# Patient Record
Sex: FEMALE | Race: WHITE | Employment: UNEMPLOYED | URBAN - METROPOLITAN AREA
[De-identification: names, ages, dates, MRNs, and addresses within clinical notes are randomized per-mention and may not be internally consistent; named-entity substitution may affect disease eponyms.]

---

## 2018-06-18 ENCOUNTER — HOSPITAL ENCOUNTER (OUTPATIENT)
Dept: GENERAL RADIOLOGY | Age: 58
Discharge: HOME | End: 2018-06-18
Attending: FAMILY MEDICINE
Payer: MEDICAID

## 2018-06-18 DIAGNOSIS — R13.10 DYSPHAGIA: ICD-10-CM

## 2018-06-18 PROCEDURE — 74230 X-RAY XM SWLNG FUNCJ C+: CPT

## 2018-06-18 PROCEDURE — 92611 MOTION FLUOROSCOPY/SWALLOW: CPT

## 2018-06-18 NOTE — PROGRESS NOTES
[address]    Speech Pathology Modified barium swallow Study  Patient: Mane Shannon (38 y.o. female)  Date: 6/18/2018  Referring Provider:  Waldo Gallagher:   Patient with noted hypernasality in voice. She reports that she is here because she had an aspiration event earlier this month that led to an admission at [address] [place] [address]. She c/o solid  And pill dysphagia since her cervical surgery in  2008. She reports that she has been receiving [facility] therapy services and is going to get a biopsy of a swollen spot in her L upper chest later this week. OBJECTIVE:   Past Medical History:   Past Medical History:   Diagnosis Date    ARF (acute renal failure) ([facility] [address] 7/4/2014    Arthritis     Cancer (HCC)     cervical and labia cancer    Carpal tunnel syndrome     Chronic pain     COPD     DDD (degenerative disc disease)     Depression 7/4/2014    Fibromyalgia     Gastrointestinal disorder     ischemic bowel    Ill-defined condition     ischemic colitis 2014    Psychiatric disorder     anxiety     Past Surgical History:   Procedure Laterality Date    HX CARPAL TUNNEL RELEASE      HX GI      ileostomy July 2014    HX GYN  hysterectomy age 23    HX HEENT      tmj    HX ORTHOPAEDIC      neck surgery    HX ORTHOPAEDIC      knee surgery    HX OTHER SURGICAL       Current Dietary Status:  Regular, thins  Radiologist: [place]  Film Views: Lateral  Patient Position: upright in Hausted chair    Trial 1: Trial 2:   Consistency Presented: Thin liquid; Solid;Puree;Pill/Tablet     How Presented: Self-fed/presented;Cup/sip;Cup/gulp; Spoon;Straw;Successive swallows      ORAL PHASE:      Bolus Acceptance: No impairment     Bolus Formation/Control: No impairment:    :     Propulsion: No impairment     Oral Residue: None   PHARYNGEAL PHASE:      Initiation of Swallow: Triggered , with thins, when pharynx full. she had deep penetration to cords in large amounts with thins consistently     Timing: Pooling 1-5 sec     Penetration: Before swallow;During swallow; To cords;From initial swallow (with thins only) in large amounts     Aspiration/Timing: No evidence of aspiration     Pharyngeal Clearance: No residue                  ESOPHAGEAL PHASE:   She had pill stuck in mid chest, which cleared with sips of thins. Then she had pill stuck at LES, which never cleared, even with sips of thins and bite of pudding. Barium pill eventually will melt. She could feel the pill stuck in both chest and lower esophagus. Since pill had difficulty clearing UES, consider that solids could back up in the pharynx and lead to post prandial aspiration event. Trial 3: Trial 4:                            :    :                                                                        Decreased Tongue Base Retraction?: No  Laryngeal Elevation: WFL (within functional limits)  Aspiration/Penetration Score: 4 (Penetration/No residue-Contrast contacts the folds, and is ejected)  Pharyngeal Symmetry: Not assessed  Pharyngeal-Esophageal Segment:  (pill stuck in mid chest. cleared to right above the UES with thins. never cleared the UES during this study, even with bites of pudding and thins)               ASSESSMENT :  Based on the objective data described above, the patient presents with functional oral-pharyngeal swallow. Mild delay with deep penetration with thins only. This is common in patients with reflux. She had some esophageal issues with pills (stuck in mid chest-cleared with liquids. Stuck at LES-never cleared). Her aspiration risk appears to be more post prandial, as she may fill esophagus with solids during a meal.  . PLAN/RECOMMENDATIONS :  Diet as tolerated. Try to change meds to liquid form if possible. Seek GI consult for evaluation of esophageal issues.  ? EGD     COMMUNICATION/EDUCATION:   The above findings and recommendations were discussed with: patient who verbalized understanding.     Thank you for this referral.  Brandon Wetzel, SLP  Time Calculation: 15 mins

## 2018-08-03 ENCOUNTER — HOSPITAL ENCOUNTER (EMERGENCY)
Age: 58
Discharge: HOME | End: 2018-08-03
Attending: EMERGENCY MEDICINE
Payer: MEDICAID

## 2018-08-03 ENCOUNTER — APPOINTMENT (OUTPATIENT)
Dept: GENERAL RADIOLOGY | Age: 58
End: 2018-08-03
Attending: EMERGENCY MEDICINE
Payer: MEDICAID

## 2018-08-03 VITALS
TEMPERATURE: 98.1 F | HEIGHT: 65 IN | OXYGEN SATURATION: 100 % | WEIGHT: 90 LBS | DIASTOLIC BLOOD PRESSURE: 78 MMHG | HEART RATE: 66 BPM | RESPIRATION RATE: 18 BRPM | BODY MASS INDEX: 14.99 KG/M2 | SYSTOLIC BLOOD PRESSURE: 114 MMHG

## 2018-08-03 DIAGNOSIS — S62.308A: Primary | ICD-10-CM

## 2018-08-03 PROCEDURE — 73562 X-RAY EXAM OF KNEE 3: CPT

## 2018-08-03 PROCEDURE — 75810000053 HC SPLINT APPLICATION

## 2018-08-03 PROCEDURE — 74011250637 HC RX REV CODE- 250/637: Performed by: EMERGENCY MEDICINE

## 2018-08-03 PROCEDURE — 99284 EMERGENCY DEPT VISIT MOD MDM: CPT

## 2018-08-03 PROCEDURE — 73130 X-RAY EXAM OF HAND: CPT

## 2018-08-03 RX ORDER — ACETAMINOPHEN 325 MG/1
650 TABLET ORAL
Status: COMPLETED | OUTPATIENT
Start: 2018-08-03 | End: 2018-08-03

## 2018-08-03 RX ORDER — OXYCODONE AND ACETAMINOPHEN 7.5; 325 MG/1; MG/1
1 TABLET ORAL
Qty: 10 TAB | Refills: 0 | Status: SHIPPED | OUTPATIENT
Start: 2018-08-03 | End: 2018-09-01

## 2018-08-03 RX ORDER — ONDANSETRON 4 MG/1
8 TABLET, ORALLY DISINTEGRATING ORAL
Status: COMPLETED | OUTPATIENT
Start: 2018-08-03 | End: 2018-08-03

## 2018-08-03 RX ADMIN — ACETAMINOPHEN 650 MG: 325 TABLET ORAL at 15:16

## 2018-08-03 RX ADMIN — ONDANSETRON 8 MG: 4 TABLET, ORALLY DISINTEGRATING ORAL at 15:16

## 2018-08-03 NOTE — ED PROVIDER NOTES
HPI Comments: 60 y.o. female with past medical history significant for arthritis, COPD, DDD, anxiety, fibromyalgia, carpal tunnel syndrome, acute renal failure, depression, GI disorder, and cervical cancer who presents via EMS from home with chief complaint of right hand pain. Patient notes falling last night because her \"knees gave out. \" She notes hitting her right hand on the fireplace mantel and complains of right hand pain and swelling. Patient also reports right knee pain. Pertinent negatives include abdominal pain. There are no other acute medical concerns at this time. Social hx: Current tobacco use (0.25 packs/day); denies alcohol use; denies illicit drug use  PCP: David Meeks MD    Note written by Farhat Kraft, as dictated by Stephen Ring MD 2:50 PM      The history is provided by the patient. No  was used.         Past Medical History:   Diagnosis Date    [facility]F [facility]acute renal failure) ([facility] [address] 7/4/2014    Arthritis     Cancer (HCC)     cervical and labia cancer    Carpal tunnel syndrome     Chronic pain     COPD     DDD (degenerative disc disease)     Depression 7/4/2014    Fibromyalgia     Gastrointestinal disorder     ischemic bowel    Ill-defined condition     ischemic colitis 2014    Psychiatric disorder     anxiety       Past Surgical History:   Procedure Laterality Date    HX CARPAL TUNNEL RELEASE      HX GI      ileostomy July 2014    HX GYN  hysterectomy age 23    HX HEENT      tmj    HX ORTHOPAEDIC      neck surgery    HX ORTHOPAEDIC      knee surgery    HX OTHER SURGICAL           Family History:   Problem Relation Age of Onset    Heart Disease Mother     Heart Disease Father     Alcohol abuse Neg Hx     Arthritis-rheumatoid Neg Hx     Asthma Neg Hx     Bleeding Prob Neg Hx     Cancer Neg Hx     Diabetes Neg Hx     Elevated Lipids Neg Hx     Headache Neg Hx     Hypertension Neg Hx     Lung Disease Neg Hx     Migraines Neg Hx     Psychiatric Disorder Neg Hx     Stroke Neg Hx     Mental Retardation Neg Hx        Social History     Social History    Marital status: LEGALLY      Spouse name: N/A    Number of children: N/A    Years of education: N/A     Occupational History    Not on file. Social History Main Topics    Smoking status: Current Every Day Smoker     Packs/day: 0.25    Smokeless tobacco: Never Used      Comment: 3 a day    Alcohol use No    Drug use: No    Sexual activity: Yes     Partners: Male     Other Topics Concern    Not on file     Social History Narrative         ALLERGIES: Codeine; Mucinex [guaifenesin]; Morphine; Oxycontin [oxycodone]; Pcn [penicillins]; Sulfa (sulfonamide antibiotics); Tramadol; Aspirin; Gabapentin; Trazodone; and Pamelor [nortriptyline]    Review of Systems   Constitutional: Negative for fever. Eyes: Negative for visual disturbance. Respiratory: Negative for cough, shortness of breath and wheezing. Cardiovascular: Negative for chest pain and leg swelling. Gastrointestinal: Negative for abdominal pain, diarrhea, nausea and vomiting. Genitourinary: Negative for dysuria. Musculoskeletal: Positive for arthralgias (right knee) and myalgias (right hand). Negative for back pain and neck stiffness. Skin: Negative for rash. Neurological: Negative. Negative for syncope and headaches. Psychiatric/Behavioral: Negative for confusion. All other systems reviewed and are negative. Vitals:    08/03/18 1451 08/03/18 1501   BP: 126/74 114/78   Pulse: 66 66   Resp: 18 18   Temp: 98.1 °F (36.7 °C)    SpO2: 100% 100%   Weight: 40.8 kg (90 lb)    Height: 5' 5\" (1.651 m)             Physical Exam   Constitutional: She appears well-developed and well-nourished. No distress. HENT:   Head: Normocephalic. Eyes: Pupils are equal, round, and reactive to light. Neck: Normal range of motion. Cardiovascular: Normal rate and regular rhythm. No murmur heard.   Pulmonary/Chest: Effort normal and breath sounds normal. No respiratory distress. Abdominal: Soft. There is no tenderness. Colostomy in LLQ   Musculoskeletal: Normal range of motion. She exhibits no edema. Right knee: She exhibits swelling (mild). She exhibits no effusion. Tenderness (mild) found. Right hand: Decreased sensation noted. Large hematoma dorsum of right hand  Movers fingers  Radial, median and ulnar function normal     Neurological: She is alert. She has normal strength. Skin: Skin is warm and dry. Psychiatric: She has a normal mood and affect. Her behavior is normal.   Nursing note and vitals reviewed.      Note written by Farhat Acosta, as dictated by Juana Benítez MD 2:50 PM    MDM      ED Course       Procedures

## 2018-08-03 NOTE — ED TRIAGE NOTES
Patient arrives via ems from home. States she fell last night at 2230 when her right knee gave out, hx of same. Per patient hit top of right hand on fie place . Denies Hitting head or LOC. Patient now co hand pain and swelling which was also noted in triage. Patient on plavix. Patient has COPD on 3L NC at all times.  Patient given 4mg zofran disintegrating tablet en route

## 2018-08-03 NOTE — DISCHARGE INSTRUCTIONS
Broken Hand: Care Instructions  Your Care Instructions  A hand can break (fracture) during sports, a fall, or other accidents. The break may happen when your hand twists, is hit, or is used to protect you in a fall. Fractures can range from a small, hairline crack, to a bone or bones broken into two or more pieces. Your treatment depends on how bad the break is. Your doctor may have put your hand in a brace, splint, or cast to allow it to heal or to keep it stable until you see another doctor. It may take weeks or months for your hand to heal. You can help it heal with some care at home. You heal best when you take good care of yourself. Eat a variety of healthy foods, and don't smoke. Follow-up care is a key part of your treatment and safety. Be sure to make and go to all appointments, and call your doctor if you are having problems. It's also a good idea to know your test results and keep a list of the medicines you take. How can you care for yourself at home? · Put ice or a cold pack on your hand for 10 to 20 minutes at a time. Try to do this every 1 to 2 hours for the next 3 days (when you are awake). Put a thin cloth between the ice and your cast or splint. Keep your cast or splint dry. · Follow the cast care instructions your doctor gives you. If you have a splint, do not take it off unless your doctor tells you to. · Be safe with medicines. Take pain medicines exactly as directed. ¨ If the doctor gave you a prescription medicine for pain, take it as prescribed. ¨ If you are not taking a prescription pain medicine, ask your doctor if you can take an over-the-counter medicine. · Prop up your hand on pillows when you sit or lie down in the first few days after the injury. Keep your hand higher than the level of your heart. This will help reduce swelling. · Follow instructions for exercises to keep your arm strong.   · Wiggle your uninjured fingers often to reduce swelling and stiffness, but do not use that hand to grasp or carry anything. When should you call for help? Call your doctor now or seek immediate medical care if:    · You have new or worse pain.     · Your hand or fingers are cool or pale or change color.     · Your cast or splint feels too tight.     · You have tingling, weakness, or numbness in your hand or fingers.    Watch closely for changes in your health, and be sure to contact your doctor if:    · You do not get better as expected.     · You have problems with your cast or splint. Where can you learn more? Go to http://lauryn-dillon.info/. Enter [phone] 125-475 in the search box to learn more about \"Broken Hand: Care Instructions. \"  Current as of: November 29, 2017  Content Version: 11.7  © 5123-6731 [employer]. Care instructions adapted under license by [employer] (which disclaims liability or warranty for this information). If you have questions about a medical condition or this instruction, always ask your healthcare professional. [place] [address] any warranty or liability for your use of this information.

## 2018-08-17 ENCOUNTER — APPOINTMENT (OUTPATIENT)
Dept: GENERAL RADIOLOGY | Age: 58
End: 2018-08-17
Attending: STUDENT IN AN ORGANIZED HEALTH CARE EDUCATION/TRAINING PROGRAM
Payer: MEDICAID

## 2018-08-17 ENCOUNTER — HOSPITAL ENCOUNTER (EMERGENCY)
Age: 58
Discharge: HOME | End: 2018-08-18
Attending: STUDENT IN AN ORGANIZED HEALTH CARE EDUCATION/TRAINING PROGRAM
Payer: MEDICAID

## 2018-08-17 DIAGNOSIS — S80.01XA CONTUSION OF RIGHT KNEE, INITIAL ENCOUNTER: Primary | ICD-10-CM

## 2018-08-17 PROCEDURE — 73562 X-RAY EXAM OF KNEE 3: CPT

## 2018-08-17 PROCEDURE — 74011250636 HC RX REV CODE- 250/636: Performed by: STUDENT IN AN ORGANIZED HEALTH CARE EDUCATION/TRAINING PROGRAM

## 2018-08-17 PROCEDURE — 99284 EMERGENCY DEPT VISIT MOD MDM: CPT

## 2018-08-17 PROCEDURE — 96372 THER/PROPH/DIAG INJ SC/IM: CPT

## 2018-08-17 RX ORDER — KETOROLAC TROMETHAMINE 30 MG/ML
30 INJECTION, SOLUTION INTRAMUSCULAR; INTRAVENOUS
Status: COMPLETED | OUTPATIENT
Start: 2018-08-17 | End: 2018-08-17

## 2018-08-17 RX ADMIN — KETOROLAC TROMETHAMINE 30 MG: 30 INJECTION, SOLUTION INTRAMUSCULAR at 23:05

## 2018-08-18 VITALS
HEIGHT: 65 IN | BODY MASS INDEX: 14.99 KG/M2 | TEMPERATURE: 98.1 F | HEART RATE: 70 BPM | DIASTOLIC BLOOD PRESSURE: 75 MMHG | WEIGHT: 90 LBS | OXYGEN SATURATION: 100 % | SYSTOLIC BLOOD PRESSURE: 113 MMHG | RESPIRATION RATE: 18 BRPM

## 2018-08-18 NOTE — DISCHARGE INSTRUCTIONS
Learning About RICE (Rest, Ice, Compression, and Elevation)  What is RICE? RICE is a way to care for an injury. RICE helps relieve pain and swelling. It may also help with healing and flexibility. RICE stands for:  · Rest and protect the injured or sore area. · Ice or a cold pack used as soon as possible. · Compression, or wrapping the injured or sore area with an elastic bandage. · Elevation (propping up) the injured or sore area. How do you do RICE? You can use RICE for home treatment when you have general aches and pains or after an injury or surgery. Rest  · Do not put weight on the injury for at least 24 to 48 hours. · Use crutches for a badly sprained knee or ankle. · Support a sprained wrist, elbow, or shoulder with a sling. Ice  · Put ice or a cold pack on the injury right away to reduce pain and swelling. Frozen vegetables will also work as an ice pack. Put a thin cloth between the ice or cold pack and your skin. The cloth protects the injured area from getting too cold. · Use ice for 10 to 15 minutes at a time for the first 48 to 72 hours. Compression  · Use compression for sprains, strains, and surgeries of the arms and legs. · Wrap the injured area with an elastic bandage or compression sleeve to reduce swelling. · Don't wrap it too tightly. If the area below it feels numb, tingles, or feels cool, loosen the wrap. Elevation  · Use elevation for areas of the body that can be propped up, such as arms and legs. · Prop up the injured area on pillows whenever you use ice. Keep it propped up anytime you sit or lie down. · Try to keep the injured area at or above the level of your heart. This will help reduce swelling and bruising. Where can you learn more? Go to http://lauryn-dillon.info/. Enter N933 in the search box to learn more about \"Learning About RICE (Rest, Ice, Compression, and Elevation). \"  Current as of: November 29, 2017  Content Version: 11.7  © 2317-5093 [employer]. Care instructions adapted under license by [employer] (which disclaims liability or warranty for this information). If you have questions about a medical condition or this instruction, always ask your healthcare professional. [address] any warranty or liability for your use of this information. Contusion: Care Instructions  Your Care Instructions  Contusion is the medical term for a bruise. It is the result of a direct blow or an impact, such as a fall. Contusions are common sports injuries. Most people think of a bruise as a black-and-blue spot. This happens when small blood vessels get torn and leak blood under the skin. But bones, muscles, and organs can also get bruised. This may damage deep tissues but not cause a bruise you can see. The doctor will do a physical exam to find the location of your contusion. You may also have tests to make sure you do not have a more serious injury, such as a broken bone or nerve damage. These may include X-rays or other imaging tests like a CT scan or MRI. Deep-tissue contusions may cause pain and swelling. But if there is no serious damage, they will often get better in a few weeks with home treatment. The doctor has checked you carefully, but problems can develop later. If you notice any problems or new symptoms, get medical treatment right away. Follow-up care is a key part of your treatment and safety. Be sure to make and go to all appointments, and call your doctor if you are having problems. It's also a good idea to know your test results and keep a list of the medicines you take. How can you care for yourself at home? · Put ice or a cold pack on the sore area for 10 to 20 minutes at a time to stop swelling. Put a thin cloth between the ice pack and your skin. · Be safe with medicines. Read and follow all instructions on the label.   ¨ If the doctor gave you a prescription medicine for pain, take it as prescribed. ¨ If you are not taking a prescription pain medicine, ask your doctor if you can take an over-the-counter medicine. · If you can, prop up the sore area on pillows as much as possible for the next few days. Try to keep the sore area above the level of your heart. When should you call for help? Call your doctor now or seek immediate medical care if:  · Your pain gets worse. · You have new or worse swelling. · You have tingling, weakness, or numbness in the area near the contusion. · The area near the contusion is cold or pale. Watch closely for changes in your health, and be sure to contact your doctor if:  · You do not get better as expected. Where can you learn more? Go to [employer].be  Enter [id number] in the search box to learn more about \"Contusion: Care Instructions. \"   © 5272-2283 [employer]. Care instructions adapted under license by [facility] (which disclaims liability or warranty for this information). This care instruction is for use with your licensed healthcare professional. If you have questions about a medical condition or this instruction, always ask your healthcare professional. [place] [address] any warranty or liability for your use of this information.   Content Version: 90.2.934093; Current as of: May 22, 2015

## 2018-08-18 NOTE — ED TRIAGE NOTES
Triage:  Ground level fall secondary to \"my knee buckling\" today around 2100. Patient unable to bear weight due to pain. Diminished strength and ROM noted during exam. Slight warmth to palpation. Mild swelling. No signs of trauma or damage to skin integrity. Pain \"22/10\". Patient tearful during triage. Denies abuse/neglect at home.

## 2018-08-18 NOTE — ED NOTES
Verbal shift change report given to Ladi Lundberg (oncoming nurse) by Cosme Spivey (offgoing nurse). Report included the following information SBAR, ED Summary and MAR.

## 2018-08-18 NOTE — ED PROVIDER NOTES
HPI Comments: 60 y.o. female with past medical history significant for chronic pain, arthritis, COPD, DDD, anxiety, fibromyalgia, carpal tunnel syndrome, ARF, depression, ischemic bowel, ischemic colitis, and cervical and labial cancer who presents from home via EMS with chief complaint of hip pain. Pt states that she was walking and her right knee \"gave out\" and she fell. Pt states that her right knee has been \"burning\" and \"hurting\" since then. Pt rates her pain to be a \"22\"/10. Pt denies drinking, alcohol use, or drug use. There are no other acute medical concerns at this time. Social hx: Current smoker. PCP: Federico Ferrera MD    Note written by lio Mcclain, as dictated by Haylee Fuchs MD 10:42 PM      The history is provided by the patient. No  was used.         Past Medical History:   Diagnosis Date    ARF (acute renal failure) ([facility] [address] 7/4/2014    Arthritis     Cancer (HCC)     cervical and labia cancer    Carpal tunnel syndrome     Chronic pain     COPD     DDD (degenerative disc disease)     Depression 7/4/2014    Fibromyalgia     Gastrointestinal disorder     ischemic bowel    Ill-defined condition     ischemic colitis 2014    Psychiatric disorder     anxiety       Past Surgical History:   Procedure Laterality Date    HX CARPAL TUNNEL RELEASE      HX GI      ileostomy July 2014    HX GYN  hysterectomy age 23    HX HEENT      tmj    HX ORTHOPAEDIC      neck surgery    HX ORTHOPAEDIC      knee surgery    HX OTHER SURGICAL           Family History:   Problem Relation Age of Onset    Heart Disease Mother     Heart Disease Father     Alcohol abuse Neg Hx     Arthritis-rheumatoid Neg Hx     Asthma Neg Hx     Bleeding Prob Neg Hx     Cancer Neg Hx     Diabetes Neg Hx     Elevated Lipids Neg Hx     Headache Neg Hx     Hypertension Neg Hx     Lung Disease Neg Hx     Migraines Neg Hx     Psychiatric Disorder Neg Hx     Stroke Neg Hx     Mental Retardation Neg Hx        Social History     Social History    Marital status: LEGALLY      Spouse name: N/A    Number of children: N/A    Years of education: N/A     Occupational History    Not on file. Social History Main Topics    Smoking status: Current Every Day Smoker     Packs/day: 0.25    Smokeless tobacco: Never Used      Comment: 3 a day    Alcohol use No    Drug use: No    Sexual activity: Yes     Partners: Male     Other Topics Concern    Not on file     Social History Narrative         ALLERGIES: Codeine; Mucinex [guaifenesin]; Morphine; Oxycontin [oxycodone]; Pcn [penicillins]; Sulfa (sulfonamide antibiotics); Tramadol; Aspirin; Gabapentin; Trazodone; and Pamelor [nortriptyline]    Review of Systems   Constitutional: Negative for activity change, diaphoresis, fatigue and fever. HENT: Negative for congestion and sore throat. Eyes: Negative for photophobia and visual disturbance. Respiratory: Negative for chest tightness and shortness of breath. Cardiovascular: Negative for chest pain, palpitations and leg swelling. Gastrointestinal: Negative for abdominal pain, blood in stool, constipation, diarrhea, nausea and vomiting. Genitourinary: Negative for difficulty urinating, dysuria, flank pain, frequency and hematuria. Musculoskeletal: Positive for arthralgias (right knee pain) and myalgias (right knee pain). Negative for back pain. Neurological: Negative for dizziness, syncope, numbness and headaches. All other systems reviewed and are negative. There were no vitals filed for this visit. Physical Exam   Constitutional: She is oriented to person, place, and time. No distress. Thin and Malnourished appearing. HENT:   Head: Normocephalic and atraumatic. Nose: Nose normal.   Mouth/Throat: Oropharynx is clear and moist. No oropharyngeal exudate. Eyes: Conjunctivae and EOM are normal. Right eye exhibits no discharge.  Left eye exhibits no discharge. No scleral icterus. Neck: Normal range of motion. Neck supple. No JVD present. No tracheal deviation present. No thyromegaly present. Cardiovascular: Normal rate, regular rhythm, normal heart sounds and intact distal pulses. Exam reveals no gallop and no friction rub. No murmur heard. Pulmonary/Chest: Effort normal and breath sounds normal. No stridor. No respiratory distress. She has no wheezes. She has no rales. She exhibits no tenderness. Abdominal: Bowel sounds are normal. She exhibits no distension and no mass. There is no tenderness. There is no rebound. Musculoskeletal: Normal range of motion. She exhibits no edema or tenderness. Full ROM in the right knee. No abrasion, avulsion, or crepitis. Tenderness to Medial/lateral aspect of the joint line. Lymphadenopathy:     She has no cervical adenopathy. Neurological: She is alert and oriented to person, place, and time. No cranial nerve deficit. Coordination normal.   Skin: Skin is warm and dry. No rash noted. She is not diaphoretic. No erythema. No pallor. Psychiatric: She has a normal mood and affect. Her behavior is normal. Judgment and thought content normal.   Nursing note and vitals reviewed. Note written by lio Mayfield, as dictated by Lauren Wynne MD 10:42 PM    MDM  Number of Diagnoses or Management Options  Contusion of right knee, initial encounter:   Diagnosis management comments: Contusion of knee. Plan:  Xray of knee, nsaids for pain. Reassessment:  Pt states pain feels better will be dc to home. xrays negative.        Amount and/or Complexity of Data Reviewed  Tests in the radiology section of CPT®: reviewed and ordered  Review and summarize past medical records: yes  Independent visualization of images, tracings, or specimens: yes    Risk of Complications, Morbidity, and/or Mortality  Presenting problems: moderate  Diagnostic procedures: moderate  Management options: moderate    Patient Progress  Patient progress: improved        ED Course       Procedures    4:36 AM  The patient has been reevaluated. The patient is ready for discharge. The patient's signs, symptoms, diagnosis, and discharge instructions have been discussed and the patient/ family has conveyed their understanding. The patient is to follow up as recommended or return to the ED should their symptoms worsen. Plan has been discussed and the patient is in agreement. LABORATORY TESTS:  No results found for this or any previous visit (from the past 12 hour(s)). IMAGING RESULTS:  XR KNEE RT 3 V   Final Result        Xr Knee Rt 3 V    Result Date: 8/17/2018  EXAM:  XR KNEE RT 3 V HISTORY: Knee pain and swelling INDICATION:   Knee pain, swelling. COMPARISON: None. FINDINGS: Three views of the right knee demonstrate no fracture or other acute osseous or articular abnormality. There is a small joint effusion. IMPRESSION:  Small joint effusion. No fracture. Dalton Trivedi MEDICATIONS GIVEN:  Medications   ketorolac (TORADOL) injection 30 mg (30 mg IntraMUSCular Given 8/17/18 9245)       IMPRESSION:  1. Contusion of right knee, initial encounter        PLAN:  1. Discharge Medication List as of 8/17/2018 11:30 PM        2.    Follow-up Information     Follow up With Details Comments Contact Info    Violet Caballero MD  If symptoms worsen [place] [phone]      [facility] EMERGENCY DEPT  If symptoms worsen [address] [place] [address]  [phone]            Return to ED for new or worsening symptoms       Armando Bellamy MD

## 2018-09-01 ENCOUNTER — HOSPITAL ENCOUNTER (EMERGENCY)
Age: 58
Discharge: HOME | End: 2018-09-01
Attending: EMERGENCY MEDICINE
Payer: MEDICAID

## 2018-09-01 ENCOUNTER — APPOINTMENT (OUTPATIENT)
Dept: GENERAL RADIOLOGY | Age: 58
End: 2018-09-01
Attending: PHYSICIAN ASSISTANT
Payer: MEDICAID

## 2018-09-01 VITALS
BODY MASS INDEX: 16.16 KG/M2 | HEIGHT: 65 IN | RESPIRATION RATE: 20 BRPM | SYSTOLIC BLOOD PRESSURE: 124 MMHG | HEART RATE: 76 BPM | DIASTOLIC BLOOD PRESSURE: 81 MMHG | TEMPERATURE: 98.1 F | OXYGEN SATURATION: 100 % | WEIGHT: 97 LBS

## 2018-09-01 DIAGNOSIS — S60.221A CONTUSION OF RIGHT HAND, INITIAL ENCOUNTER: ICD-10-CM

## 2018-09-01 DIAGNOSIS — T14.8XXA HEMATOMA: Primary | ICD-10-CM

## 2018-09-01 PROCEDURE — 74011250637 HC RX REV CODE- 250/637: Performed by: PHYSICIAN ASSISTANT

## 2018-09-01 PROCEDURE — 73130 X-RAY EXAM OF HAND: CPT

## 2018-09-01 PROCEDURE — 99284 EMERGENCY DEPT VISIT MOD MDM: CPT

## 2018-09-01 PROCEDURE — 77030018836 HC SOL IRR NACL ICUM -A

## 2018-09-01 RX ORDER — OXYCODONE AND ACETAMINOPHEN 5; 325 MG/1; MG/1
1 TABLET ORAL
Status: COMPLETED | OUTPATIENT
Start: 2018-09-01 | End: 2018-09-01

## 2018-09-01 RX ORDER — HYDROCODONE BITARTRATE AND ACETAMINOPHEN 5; 325 MG/1; MG/1
1 TABLET ORAL
Qty: 10 TAB | Refills: 0 | Status: SHIPPED | OUTPATIENT
Start: 2018-09-01

## 2018-09-01 RX ADMIN — OXYCODONE HYDROCHLORIDE AND ACETAMINOPHEN 1 TABLET: 5; 325 TABLET ORAL at 22:42

## 2018-09-02 NOTE — ED NOTES
Wound washed with normal saline. Steri-strips applied. Covered with gauze pad and wrap. Wrapped with Ace bandage. PVS remains in intact with brisk cap refill, less than 2 sec. Provided with DC instructions by provider. Verbalized understanding.  Ambulated out of ED with daughter for transportation home

## 2018-09-02 NOTE — DISCHARGE INSTRUCTIONS
Hand Bruises: Care Instructions  Your Care Instructions  Bruises, or contusions, can happen as a result of an impact or fall. Most people think of a bruise as a black-and-blue spot. This happens when small blood vessels get torn and leak blood under the skin. The bruise may turn purplish black, reddish blue, or yellowish green as it heals. But bones and muscles can also get bruised. This may damage the hand but not cause a bruise that you can see. Most bruises aren't serious and will go away on their own in 2 to 4 weeks. But sometimes a more serious hand injury might not heal on its own. Tell your doctor if you have new symptoms or your injury is not getting better over time. You may have tests to see if you have bone or nerve damage. These tests may include X-rays, a CT scan, or an MRI. If you damaged bones or muscles, you may need more treatment. The doctor has checked you carefully, but problems can develop later. If you notice any problems or new symptoms, get medical treatment right away. Follow-up care is a key part of your treatment and safety. Be sure to make and go to all appointments, and call your doctor if you are having problems. It's also a good idea to know your test results and keep a list of the medicines you take. How can you care for yourself at home? · Put ice or a cold pack on the hand for 10 to 20 minutes at a time. Put a thin cloth between the ice and your skin. · Prop up your hand on a pillow when you ice it or anytime you sit or lie down during the next 3 days. Try to keep your hand above the level of your heart. This will help reduce swelling. · Be safe with medicines. Read and follow all instructions on the label. ¨ If the doctor gave you a prescription medicine for pain, take it as prescribed. ¨ If you are not taking a prescription pain medicine, ask your doctor if you can take an over-the-counter medicine.   · Be sure to follow your doctor's advice about moving and exercising your injured hand. When should you call for help? Call your doctor now or seek immediate medical care if:    · Your pain gets worse.     · You have new or worse swelling.     · You have tingling, weakness, or numbness in the area near the bruise.     · The area near the bruise is cold or pale.     · You have symptoms of infection, such as:  ¨ Increased pain, swelling, warmth, or redness. ¨ Red streaks leading from the area. ¨ Pus draining from the area. ¨ A fever.    Watch closely for changes in your health, and be sure to contact your doctor if:    · You do not get better as expected. Where can you learn more? Go to http://[employer]dillon.info/. Enter Z161 in the search box to learn more about \"Hand Bruises: Care Instructions. \"  Current as of: November 20, 2017  Content Version: 11.7  © 8638-9124 [employer]. Care instructions adapted under license by [employer] (which disclaims liability or warranty for this information). If you have questions about a medical condition or this instruction, always ask your healthcare professional. [place] [address] any warranty or liability for your use of this information. We hope that we have addressed all of your medical concerns. The examination and treatment you received in the Emergency Department were for an emergent problem and were not intended as complete care. It is important that you follow up with your healthcare provider(s) for ongoing care. If your symptoms worsen or do not improve as expected, and you are unable to reach your usual health care provider(s), you should return to the Emergency Department. Today's healthcare is undergoing tremendous change, and patient satisfaction surveys are one of the many tools to assess the quality of medical care. You may receive a survey from the [employer] organization regarding your experience in the Emergency Department.   I hope that your experience has been completely positive, particularly the medical care that I provided. As such, please participate in the survey; anything less than excellent does not meet my expectations or intentions. [facility] and [facility] participate in nationally recognized quality of care measures. If your blood pressure is greater than 120/80, as reported below, we urge that you seek medical care to address the potential of high blood pressure, commonly known as hypertension. Hypertension can be hereditary or can be caused by certain medical conditions, pain, stress, or \"white coat syndrome. \"       Please make an appointment with your health care provider(s) for follow up of your Emergency Department visit. VITALS:   Patient Vitals for the past 8 hrs:   Temp Pulse Resp BP SpO2   09/01/18 2220 98.1 °F (36.7 °C) 76 20 124/81 100 %          Thank you for allowing us to provide you with medical care today. We realize that you have many choices for your emergency care needs. Please choose us in the future for any continued health care needs. Omer Baeza, [facility]: [phone]            No results found for this or any previous visit (from the past 24 hour(s)). Xr Hand Rt Min 3 V    Result Date: 9/1/2018  EXAM:  XR HAND RT MIN 3 V INDICATION:  Right hand pain. COMPARISON: August 3, 2018 FINDINGS: Three views of the right hand demonstrate no fracture or other acute osseous or articular abnormality. There is dorsal soft tissue swelling. IMPRESSION:  No acute osseous or articular abnormality. Dorsal soft tissue swelling. Katherene Means
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
## 2018-12-24 ENCOUNTER — APPOINTMENT (OUTPATIENT)
Dept: GENERAL RADIOLOGY | Age: 58
End: 2018-12-24
Attending: EMERGENCY MEDICINE
Payer: MEDICAID

## 2018-12-24 ENCOUNTER — HOSPITAL ENCOUNTER (EMERGENCY)
Age: 58
Discharge: HOME | End: 2018-12-24
Attending: EMERGENCY MEDICINE
Payer: MEDICAID

## 2018-12-24 VITALS
WEIGHT: 109 LBS | HEIGHT: 65 IN | OXYGEN SATURATION: 97 % | SYSTOLIC BLOOD PRESSURE: 138 MMHG | TEMPERATURE: 95.4 F | DIASTOLIC BLOOD PRESSURE: 98 MMHG | RESPIRATION RATE: 16 BRPM | HEART RATE: 88 BPM | BODY MASS INDEX: 18.16 KG/M2

## 2018-12-24 DIAGNOSIS — M25.461 KNEE EFFUSION, RIGHT: Primary | ICD-10-CM

## 2018-12-24 PROCEDURE — 99283 EMERGENCY DEPT VISIT LOW MDM: CPT

## 2018-12-24 PROCEDURE — 74011250636 HC RX REV CODE- 250/636: Performed by: EMERGENCY MEDICINE

## 2018-12-24 PROCEDURE — L1830 KO IMMOB CANVAS LONG PRE OTS: HCPCS

## 2018-12-24 PROCEDURE — 73562 X-RAY EXAM OF KNEE 3: CPT

## 2018-12-24 PROCEDURE — 96372 THER/PROPH/DIAG INJ SC/IM: CPT

## 2018-12-24 RX ORDER — KETOROLAC TROMETHAMINE 30 MG/ML
30 INJECTION, SOLUTION INTRAMUSCULAR; INTRAVENOUS
Status: COMPLETED | OUTPATIENT
Start: 2018-12-24 | End: 2018-12-24

## 2018-12-24 RX ORDER — KETOROLAC TROMETHAMINE 30 MG/ML
60 INJECTION, SOLUTION INTRAMUSCULAR; INTRAVENOUS
Status: DISCONTINUED | OUTPATIENT
Start: 2018-12-24 | End: 2018-12-24

## 2018-12-24 RX ORDER — IBUPROFEN 800 MG/1
800 TABLET ORAL
Qty: 20 TAB | Refills: 0 | Status: SHIPPED | OUTPATIENT
Start: 2018-12-24 | End: 2018-12-31

## 2018-12-24 RX ADMIN — KETOROLAC TROMETHAMINE 30 MG: 30 INJECTION, SOLUTION INTRAMUSCULAR at 21:16

## 2018-12-25 ENCOUNTER — HOSPITAL ENCOUNTER (EMERGENCY)
Age: 58
Discharge: HOME | End: 2018-12-26
Attending: EMERGENCY MEDICINE
Payer: MEDICAID

## 2018-12-25 ENCOUNTER — APPOINTMENT (OUTPATIENT)
Dept: GENERAL RADIOLOGY | Age: 58
End: 2018-12-25
Attending: PHYSICIAN ASSISTANT
Payer: MEDICAID
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
## 2018-12-25 DIAGNOSIS — E16.2 HYPOGLYCEMIA: ICD-10-CM

## 2018-12-25 LAB
ALBUMIN SERPL-MCNC: 3.2 G/DL (ref 3.5–5)
ALBUMIN/GLOB SERPL: 0.9 {RATIO} (ref 1.1–2.2)
ALP SERPL-CCNC: 91 U/L (ref 45–117)
ALT SERPL-CCNC: 21 U/L (ref 12–78)
ANION GAP SERPL CALC-SCNC: 10 MMOL/L (ref 5–15)
AST SERPL-CCNC: 14 U/L (ref 15–37)
BASOPHILS # BLD: 0 K/UL (ref 0–0.1)
BASOPHILS NFR BLD: 1 % (ref 0–1)
BILIRUB SERPL-MCNC: 0.3 MG/DL (ref 0.2–1)
BUN SERPL-MCNC: 35 MG/DL (ref 6–20)
BUN/CREAT SERPL: 28 (ref 12–20)
CALCIUM SERPL-MCNC: 9.1 MG/DL (ref 8.5–10.1)
CHLORIDE SERPL-SCNC: 110 MMOL/L (ref 97–108)
CO2 SERPL-SCNC: 24 MMOL/L (ref 21–32)
COMMENT, HOLDF: NORMAL
CREAT SERPL-MCNC: 1.23 MG/DL (ref 0.55–1.02)
DIFFERENTIAL METHOD BLD: ABNORMAL
EOSINOPHIL # BLD: 0.1 K/UL (ref 0–0.4)
EOSINOPHIL NFR BLD: 2 % (ref 0–7)
ERYTHROCYTE [DISTWIDTH] IN BLOOD BY AUTOMATED COUNT: 12.8 % (ref 11.5–14.5)
GLOBULIN SER CALC-MCNC: 3.6 G/DL (ref 2–4)
GLUCOSE BLD STRIP.AUTO-MCNC: 134 MG/DL (ref 65–100)
GLUCOSE SERPL-MCNC: 65 MG/DL (ref 65–100)
HCT VFR BLD AUTO: 44 % (ref 35–47)
HGB BLD-MCNC: 13.9 G/DL (ref 11.5–16)
IMM GRANULOCYTES # BLD: 0 K/UL (ref 0–0.04)
IMM GRANULOCYTES NFR BLD AUTO: 0 % (ref 0–0.5)
LYMPHOCYTES # BLD: 2.6 K/UL (ref 0.8–3.5)
LYMPHOCYTES NFR BLD: 41 % (ref 12–49)
MCH RBC QN AUTO: 32.3 PG (ref 26–34)
MCHC RBC AUTO-ENTMCNC: 31.6 G/DL (ref 30–36.5)
MCV RBC AUTO: 102.1 FL (ref 80–99)
MONOCYTES # BLD: 0.4 K/UL (ref 0–1)
MONOCYTES NFR BLD: 6 % (ref 5–13)
NEUTS SEG # BLD: 3.2 K/UL (ref 1.8–8)
NEUTS SEG NFR BLD: 50 % (ref 32–75)
NRBC # BLD: 0 K/UL (ref 0–0.01)
NRBC BLD-RTO: 0 PER 100 WBC
PLATELET # BLD AUTO: 257 K/UL (ref 150–400)
PMV BLD AUTO: 10 FL (ref 8.9–12.9)
POTASSIUM SERPL-SCNC: 4.5 MMOL/L (ref 3.5–5.1)
PROT SERPL-MCNC: 6.8 G/DL (ref 6.4–8.2)
RBC # BLD AUTO: 4.31 M/UL (ref 3.8–5.2)
SAMPLES BEING HELD,HOLD: NORMAL
SERVICE CMNT-IMP: ABNORMAL
SODIUM SERPL-SCNC: 144 MMOL/L (ref 136–145)
WBC # BLD AUTO: 6.4 K/UL (ref 3.6–11)

## 2018-12-25 PROCEDURE — 36415 COLL VENOUS BLD VENIPUNCTURE: CPT

## 2018-12-25 PROCEDURE — 74011250636 HC RX REV CODE- 250/636: Performed by: PHYSICIAN ASSISTANT

## 2018-12-25 PROCEDURE — 96374 THER/PROPH/DIAG INJ IV PUSH: CPT

## 2018-12-25 PROCEDURE — 72100 X-RAY EXAM L-S SPINE 2/3 VWS: CPT

## 2018-12-25 PROCEDURE — 85025 COMPLETE CBC W/AUTO DIFF WBC: CPT

## 2018-12-25 PROCEDURE — 80053 COMPREHEN METABOLIC PANEL: CPT

## 2018-12-25 PROCEDURE — 80178 ASSAY OF LITHIUM: CPT

## 2018-12-25 PROCEDURE — 96361 HYDRATE IV INFUSION ADD-ON: CPT

## 2018-12-25 PROCEDURE — 82962 GLUCOSE BLOOD TEST: CPT

## 2018-12-25 PROCEDURE — 99285 EMERGENCY DEPT VISIT HI MDM: CPT

## 2018-12-25 RX ORDER — HYDROMORPHONE HYDROCHLORIDE 2 MG/ML
1 INJECTION, SOLUTION INTRAMUSCULAR; INTRAVENOUS; SUBCUTANEOUS
Status: COMPLETED | OUTPATIENT
Start: 2018-12-25 | End: 2018-12-25

## 2018-12-25 RX ORDER — MORPHINE SULFATE 4 MG/ML
4 INJECTION, SOLUTION INTRAMUSCULAR; INTRAVENOUS
Status: DISCONTINUED | OUTPATIENT
Start: 2018-12-25 | End: 2018-12-25

## 2018-12-25 RX ADMIN — SODIUM CHLORIDE 1000 ML: 900 INJECTION, SOLUTION INTRAVENOUS at 22:21

## 2018-12-25 RX ADMIN — HYDROMORPHONE HYDROCHLORIDE 1 MG: 2 INJECTION, SOLUTION INTRAMUSCULAR; INTRAVENOUS; SUBCUTANEOUS at 22:39

## 2018-12-25 NOTE — ED PROVIDER NOTES
Sangita Ponce is a 60 y.o. female  who presents by private vehicle to ER with c/o Patient presents with:  Fall  Knee Pain  Patient presents with right knee pain after her knee gave out and she fell on her knee. Patient with right knee pain and mild swelling. Denies head injury or LOC. Patient denies any associated numbness or tingling. She specifically denies any fevers, chills, nausea, vomiting, chest pain, shortness of breath, headache, rash, diarrhea, abdominal pain, urinary/bowel changes, sweating or weight loss. PCP: Michael Maciel MD   PMHx significant for: Past Medical History:  7/4/2014: ARF (acute renal failure) ([facility] [address]  No date: Arthritis  No date: Cancer [facility])      Comment:  cervical and labia cancer  No date: Carpal tunnel syndrome  No date: Chronic pain  No date: COPD  No date: DDD (degenerative disc disease)  7/4/2014: Depression  No date: Fibromyalgia  No date: Gastrointestinal disorder      Comment:  ischemic bowel  No date: Ill-defined condition      Comment:  ischemic colitis 2014  No date: Psychiatric disorder      Comment:  anxiety   PSHx significant for: Past Surgical History:  No date: HX CARPAL TUNNEL RELEASE  No date: HX GI      Comment:  ileostomy July 2014  hysterectomy age 23: HX GYN  No date: HX HEENT      Comment:  tmj  No date: HX ORTHOPAEDIC      Comment:  neck surgery  No date: HX ORTHOPAEDIC      Comment:  knee surgery  No date: HX OTHER SURGICAL  Social Hx: Tobacco use: Social History    Tobacco Use      Smoking status: Current Every Day Smoker        Packs/day: 0.25      Smokeless tobacco: Never Used      Tobacco comment: 3 a day  ; EtOH use: The patient states she drinks 0 per week.; Illicit Drug use: Allergies:   -- Codeine -- Anaphylaxis and Rash   -- Mucinex (Guaifenesin) -- Anaphylaxis   -- Morphine -- Nausea and Vomiting    --  Patient reports this drug \"makes her heart go             crazy. \"   -- Pcn (Penicillins) -- Palpitations   -- Sulfa (Sulfonamide Antibiotics) -- Nausea Only   -- Tramadol -- Nausea and Vomiting   -- Aspirin -- Other (comments)    --  Pt has ulcers. PT REPORTS TAKES REGULARLY   -- Gabapentin -- Nausea and Vomiting   -- Trazodone -- Other (comments)    --  Restless leg syndrome   -- Pamelor (Nortriptyline) -- Other (comments)    --  Restless legs    There are no other complaints, changes or physical findings at this time. 61 yo WF presnets after fall occurring early this morning. Pt states her right knee gave out and she fell injuring her left knee. C/o chronic right knee pain radiating to right hip, worsening today. Now with left knee pain, 10/10, left anterior knee, nonradiating. Denies weakness/numbness. Denies fever, chills, back pain, bowel/bladder incontinence. Tetanus is UTD. No other complaints. Taking tylenol for pain at home. Is on 3L oxygen at home for COPD.    Past Medical History:   Diagnosis Date    ARF (acute renal failure) ([facility] [address] 7/4/2014    Arthritis     Cancer (HCC)     cervical and labia cancer    Carpal tunnel syndrome     Chronic pain     COPD     DDD (degenerative disc disease)     Depression 7/4/2014    Fibromyalgia     Gastrointestinal disorder     ischemic bowel    Ill-defined condition     ischemic colitis 2014    Psychiatric disorder     anxiety       Past Surgical History:   Procedure Laterality Date    HX CARPAL TUNNEL RELEASE      HX GI      ileostomy July 2014    HX GYN  hysterectomy age 23    HX HEENT      tmj    HX ORTHOPAEDIC      neck surgery    HX ORTHOPAEDIC      knee surgery    HX OTHER SURGICAL           Family History:   Problem Relation Age of Onset    Heart Disease Mother     Heart Disease Father     Alcohol abuse Neg Hx     Arthritis-rheumatoid Neg Hx     Asthma Neg Hx     Bleeding Prob Neg Hx     Cancer Neg Hx     Diabetes Neg Hx     Elevated Lipids Neg Hx     Headache Neg Hx     Hypertension Neg Hx     Lung Disease Neg Hx     Migraines Neg Hx     Psychiatric Disorder Neg Hx     Stroke Neg Hx     Mental Retardation Neg Hx        Social History     Socioeconomic History    Marital status: LEGALLY      Spouse name: Not on file    Number of children: Not on file    Years of education: Not on file    Highest education level: Not on file   Social Needs    Financial resource strain: Not on file    Food insecurity - worry: Not on file    Food insecurity - inability: Not on file    Transportation needs - medical: Not on file   [place] needs - non-medical: Not on file   Occupational History    Not on file   Tobacco Use    Smoking status: Current Every Day Smoker     Packs/day: 0.25    Smokeless tobacco: Never Used    Tobacco comment: 3 a day   Substance and Sexual Activity    Alcohol use: No    Drug use: No    Sexual activity: Yes     Partners: Male   Other Topics Concern    Not on file   Social History Narrative    Not on file         ALLERGIES: Codeine; Mucinex [guaifenesin]; Morphine; Pcn [penicillins]; Sulfa (sulfonamide antibiotics); Tramadol; Aspirin; Gabapentin; Trazodone; and Pamelor [nortriptyline]    Review of Systems   Constitutional: Negative for chills and fever. Respiratory: Negative for shortness of breath. Cardiovascular: Negative for chest pain. Musculoskeletal: Positive for arthralgias. Skin: Positive for wound. Negative for rash. All other systems reviewed and are negative. There were no vitals filed for this visit. Physical Exam   Constitutional: She is oriented to person, place, and time. She appears well-developed. HENT:   Head: Normocephalic and atraumatic. Right Ear: External ear normal.   Left Ear: External ear normal.   Nose: Nose normal.   Mouth/Throat: Oropharynx is clear and moist. No oropharyngeal exudate. Eyes: Conjunctivae, EOM and lids are normal. Right eye exhibits no discharge. Left eye exhibits no discharge. Neck: Normal range of motion.  No tracheal deviation present. No thyromegaly present. Cardiovascular: Normal rate, regular rhythm, normal heart sounds and intact distal pulses. Pulmonary/Chest: Effort normal and breath sounds normal.   Abdominal: Soft. Normal appearance and bowel sounds are normal.   Musculoskeletal: Normal range of motion. Right knee: She exhibits effusion. Tenderness found. RLE Extremity is NVI. Pulses 2+ distally, capillary refill <3 seconds, sensation intact. Patient is able to move toes with out difficulty. Neurological: She is alert and oriented to person, place, and time. Skin: Skin is warm and dry. Psychiatric: She has a normal mood and affect. Judgment normal.      Physical Examination: General appearance - alert, well appearing, and in no distress, oriented to person, place, and time and normal appearing weight  Eyes - pupils equal and reactive, extraocular eye movements intact  Neck - supple, no significant adenopathy  Chest - clear to auscultation, no wheezes, rales or rhonchi, symmetric air entry  Heart - normal rate, regular rhythm, normal S1, S2, no murmurs, rubs, clicks or gallops  Abdomen - soft, nontender, nondistended, no masses or organomegaly  Back exam - full range of motion, no tenderness, palpable spasm or pain on motion  Neurological - alert, oriented, normal speech, no focal findings or movement disorder noted  Musculoskeletal - no joint tenderness, deformity or swelling  Extremities - peripheral pulses normal, no pedal edema, no clubbing or cyanosis  Skin - normal coloration and turgor, no rashes, no suspicious skin lesions noted  MDM  Number of Diagnoses or Management Options  Knee effusion, right:   Diagnosis management comments: Assesment/Plan- 60 y.o. Patient presents with:  Fall  Knee Pain  differential includes: knee pain, effusion, strain, fracture. Labs and imaging reviewed with joint effusion. Patient placed in knee immobilizer, patient reports having walker at home. Recommend Ortho follow up. Patient educated on reasons to return to the ED.          Amount and/or Complexity of Data Reviewed  Tests in the radiology section of CPT®: ordered and reviewed  Decide to obtain previous medical records or to obtain history from someone other than the patient: yes  Review and summarize past medical records: yes  Independent visualization of images, tracings, or specimens: yes    Patient Progress  Patient progress: improved         Procedures

## 2018-12-25 NOTE — DISCHARGE INSTRUCTIONS
We hope that we have addressed all of your medical concerns. The examination and treatment you received in the Emergency Department were for an emergent problem and were not intended as complete care. It is important that you follow up with your healthcare provider(s) for ongoing care. If your symptoms worsen or do not improve as expected, and you are unable to reach your usual health care provider(s), you should return to the Emergency Department. Today's healthcare is undergoing tremendous change, and patient satisfaction surveys are one of the many tools to assess the quality of medical care. You may receive a survey from the [employer] regarding your experience in the Emergency Department. I hope that your experience has been completely positive, particularly the medical care that I provided. As such, please participate in the survey; anything less than excellent does not meet my expectations or intentions. [facility] and [address] [place] [address] participate in nationally recognized quality of care measures. If your blood pressure is greater than 120/80, as reported below, we urge that you seek medical care to address the potential of high blood pressure, commonly known as hypertension. Hypertension can be hereditary or can be caused by certain medical conditions, pain, stress, or \"white coat syndrome. \"       Please make an appointment with your health care provider(s) for follow up of your Emergency Department visit. VITALS:   Patient Vitals for the past 8 hrs:   Temp Resp BP SpO2   12/24/18 1931 95.4 °F (35.2 °C) 18 (!) 138/98 97 %          Thank you for allowing us to provide you with medical care today. We realize that you have many choices for your emergency care needs. Please choose us in the future for any continued health care needs. Kathryn Barba  [address] [place] [address], [address] [place] [address].   Office: [phone]            No results found for this or any previous visit (from the past 24 hour(s)). Xr Knee Rt 3 V    Result Date: 12/24/2018  EXAM: XR KNEE RT 3 V INDICATION: pain. Right knee pain COMPARISON: None. FINDINGS: Three views of the right knee demonstrate mild osteoarthritis. There is a suprapatellar joint effusion. A fracture or dislocation is not seen. IMPRESSION: Joint effusion.

## 2018-12-25 NOTE — ED TRIAGE NOTES
Pt states having pain to right knee/ hip for a while and her leg gave out causing her to fall in her home and pain to left knee now.

## 2018-12-25 NOTE — ED NOTES
Pt inquired about motrin while on Lithium. Discussed with pharmacy and Bill Peabody advised pt to take Tylenol instead.

## 2018-12-25 NOTE — ED NOTES
Radiology tech advised that patient c/o of right knee pain and states left knee is no hurting. X-ray  Entered for right knee three view.

## 2018-12-25 NOTE — ED NOTES
8:33 PM  Change of shift. Care of patient taken over from Dr. Amy Turner; H&P reviewed. Awaiting x-ray results. 9:45 PM  Pt seen by PA and discharged.

## 2018-12-26 VITALS
TEMPERATURE: 97.6 F | WEIGHT: 109 LBS | DIASTOLIC BLOOD PRESSURE: 81 MMHG | HEIGHT: 65 IN | RESPIRATION RATE: 16 BRPM | OXYGEN SATURATION: 100 % | HEART RATE: 81 BPM | BODY MASS INDEX: 18.16 KG/M2 | SYSTOLIC BLOOD PRESSURE: 118 MMHG

## 2018-12-26 LAB
DATE LAST DOSE: ABNORMAL
GLUCOSE BLD STRIP.AUTO-MCNC: 107 MG/DL (ref 65–100)
LITHIUM SERPL-SCNC: <0.2 MMOL/L (ref 0.6–1.2)
REPORTED DOSE,DOSE: ABNORMAL UNITS
REPORTED DOSE/TIME,TMG: ABNORMAL
SERVICE CMNT-IMP: ABNORMAL

## 2018-12-26 PROCEDURE — 82962 GLUCOSE BLOOD TEST: CPT

## 2018-12-26 PROCEDURE — 96361 HYDRATE IV INFUSION ADD-ON: CPT

## 2018-12-26 RX ORDER — LITHIUM CARBONATE 300 MG
300 TABLET ORAL 3 TIMES DAILY
Qty: 21 TAB | Refills: 0 | Status: SHIPPED | OUTPATIENT
Start: 2018-12-26

## 2018-12-26 RX ORDER — SERTRALINE HYDROCHLORIDE 100 MG/1
100 TABLET, FILM COATED ORAL DAILY
Qty: 7 TAB | Refills: 0 | Status: SHIPPED | OUTPATIENT
Start: 2018-12-26 | End: 2019-01-02

## 2018-12-26 NOTE — DISCHARGE INSTRUCTIONS
Preventing Falls: Care Instructions  Your Care Instructions    Getting around your home safely can be a challenge if you have injuries or health problems that make it easy for you to fall. Loose rugs and furniture in walkways are among the dangers for many older people who have problems walking or who have poor eyesight. People who have conditions such as arthritis, osteoporosis, or dementia also have to be careful not to fall. You can make your home safer with a few simple measures. Follow-up care is a key part of your treatment and safety. Be sure to make and go to all appointments, and call your doctor if you are having problems. It's also a good idea to know your test results and keep a list of the medicines you take. How can you care for yourself at home? Taking care of yourself  · You may get dizzy if you do not drink enough water. To prevent dehydration, drink plenty of fluids, enough so that your urine is light yellow or clear like water. Choose water and other caffeine-free clear liquids. If you have kidney, heart, or liver disease and have to limit fluids, talk with your doctor before you increase the amount of fluids you drink. · Exercise regularly to improve your strength, muscle tone, and balance. Walk if you can. Swimming may be a good choice if you cannot walk easily. · Have your vision and hearing checked each year or any time you notice a change. If you have trouble seeing and hearing, you might not be able to avoid objects and could lose your balance. · Know the side effects of the medicines you take. Ask your doctor or pharmacist whether the medicines you take can affect your balance. Sleeping pills or sedatives can affect your balance. · Limit the amount of alcohol you drink. Alcohol can impair your balance and other senses. · Ask your doctor whether calluses or corns on your feet need to be removed.  If you wear loose-fitting shoes because of calluses or corns, you can lose your balance and fall. · Talk to your doctor if you have numbness in your feet. Preventing falls at home  · Remove raised doorway thresholds, throw rugs, and clutter. Repair loose carpet or raised areas in the floor. · Move furniture and electrical cords to keep them out of walking paths. · Use nonskid floor wax, and wipe up spills right away, especially on ceramic tile floors. · If you use a walker or cane, put rubber tips on it. If you use crutches, clean the bottoms of them regularly with an abrasive pad, such as steel wool. · Keep your house well lit, especially Sarai Marcia, and outside walkways. Use night-lights in areas such as hallways and bathrooms. Add extra light switches or use remote switches (such as switches that go on or off when you clap your hands) to make it easier to turn lights on if you have to get up during the night. · Install sturdy handrails on stairways. · Move items in your cabinets so that the things you use a lot are on the lower shelves (about waist level). · Keep a cordless phone and a flashlight with new batteries by your bed. If possible, put a phone in each of the main rooms of your house, or carry a cell phone in case you fall and cannot reach a phone. Or, you can wear a device around your neck or wrist. You push a button that sends a signal for help. · Wear low-heeled shoes that fit well and give your feet good support. Use footwear with nonskid soles. Check the heels and soles of your shoes for wear. Repair or replace worn heels or soles. · Do not wear socks without shoes on wood floors. · Walk on the grass when the sidewalks are slippery. If you live in an area that gets snow and ice in the winter, sprinkle salt on slippery steps and sidewalks. Preventing falls in the bath  · Install grab bars and nonskid mats inside and outside your shower or tub and near the toilet and sinks. · Use shower chairs and bath benches.   · Use a hand-held shower head that will allow you to sit while showering. · Get into a tub or shower by putting the weaker leg in first. Get out of a tub or shower with your strong side first.  · Repair loose toilet seats and consider installing a raised toilet seat to make getting on and off the toilet easier. · Keep your bathroom door unlocked while you are in the shower. Where can you learn more? Go to http://lauryn-dillon.info/. Enter [id number] [id number] in the search box to learn more about \"Preventing Falls: Care Instructions. \"  Current as of: March 16, 2018  Content Version: 11.8  © 7647-0669 [employer]. Care instructions adapted under license by [employer] (which disclaims liability or warranty for this information). If you have questions about a medical condition or this instruction, always ask your healthcare professional. [place] [address] any warranty or liability for your use of this information. Hypoglycemia: Care Instructions  Your Care Instructions    Hypoglycemia means that your blood sugar is low and your body is not getting enough fuel. Some people get low blood sugar from not eating often enough. Some medicines to treat diabetes can cause low blood sugar. People who have had surgery on their stomachs or intestines may get hypoglycemia. Problems with the pancreas, kidneys, or liver also can cause low blood sugar. A snack or drink with sugar in it will raise your blood sugar and should ease your symptoms right away. Your doctor may recommend that you change or stop your medicines until you can get your blood sugar levels under control. In the long run, you may need to change your diet and eating habits so that you get enough fuel for your body throughout the day. Follow-up care is a key part of your treatment and safety. Be sure to make and go to all appointments, and call your doctor if you are having problems.  It's also a good idea to know your test results and keep a list of the medicines you take. How can you care for yourself at home? · Learn to recognize the early signs of low blood sugar. Signs include:  ? Nausea. ? Hunger. ? Feeling nervous, irritable, or shaky. ? Cold, clammy, wet skin. ? Sweating (when you are not exercising). ? A fast heartbeat.  ? Numbness or tingling of the fingertips or lips. · If you feel an episode of low blood sugar coming on, drink fruit juice or sugared (not diet) soda, or eat sugar in the form of candy, cubes, or tablets. [employer] are another [employer]. · Eat small, frequent meals so that you do not get too hungry between meals. · Balance extra exercise with eating more. · Keep a written record of your low blood sugar episodes, including when you last ate and what you ate, so that you can learn what causes your blood sugar to drop. · Make sure your family, friends, and coworkers know the symptoms of low blood sugar and know what to do to get your sugar level up. · Wear medical alert jewelry that lists your condition. You can buy this at most drugsSamba.mees. When should you call for help? Call 911 anytime you think you may need emergency care. For example, call if:    · You passed out (lost consciousness).     · You are confused or cannot think clearly.     · Your blood sugar is very high or very low.    Watch closely for changes in your health, and be sure to contact your doctor if:    · Your blood sugar stays outside the level your doctor set for you.     · You have any problems. Where can you learn more? Go to http://lauryn-dillon.info/. Enter K197 in the search box to learn more about \"Hypoglycemia: Care Instructions. \"  Current as of: December 7, 2017  Content Version: 11.8  © 7434-0801 [employer]. Care instructions adapted under license by [employer] (which disclaims liability or warranty for this information).  If you have questions about a medical condition or this instruction, always ask your healthcare professional. [place] [address] any warranty or liability for your use of this information. We hope that we have addressed all of your medical concerns. The examination and treatment you received in the Emergency Department were for an emergent problem and were not intended as complete care. It is important that you follow up with your healthcare provider(s) for ongoing care. If your symptoms worsen or do not improve as expected, and you are unable to reach your usual health care provider(s), you should return to the Emergency Department. Today's healthcare is undergoing tremendous change, and patient satisfaction surveys are one of the many tools to assess the quality of medical care. You may receive a survey from the [employer] organization regarding your experience in the Emergency Department. I hope that your experience has been completely positive, particularly the medical care that I provided. As such, please participate in the survey; anything less than excellent does not meet my expectations or intentions. [facility] and [address] [place] [address] participate in nationally recognized quality of care measures. If your blood pressure is greater than 120/80, as reported below, we urge that you seek medical care to address the potential of high blood pressure, commonly known as hypertension. Hypertension can be hereditary or can be caused by certain medical conditions, pain, stress, or \"white coat syndrome. \"       Please make an appointment with your health care provider(s) for follow up of your Emergency Department visit. VITALS:   Patient Vitals for the past 8 hrs:   Temp Pulse Resp BP SpO2   12/25/18 2152 97.6 °F (36.4 °C) 81 16 (!) 133/96 98 %          Thank you for allowing us to provide you with medical care today. We realize that you have many choices for your emergency care needs.   Please choose us in the future for any continued health care needs. Sherry Ochoaabiola Baeza, [facility].   Office: [phone]            Recent Results (from the past 24 hour(s))   GLUCOSE, POC    Collection Time: 12/25/18  9:53 PM   Result Value Ref Range    Glucose (POC) 134 (H) 65 - 100 mg/dL    Performed by [facility]lyson Luque [facility])    [address] [place] [address] Shira Covington    Collection Time: 12/25/18 10:04 PM   Result Value Ref Range    SAMPLES BEING HELD 1SST, 1BLU     COMMENT        Add-on orders for these samples will be processed based on acceptable specimen integrity and analyte stability, which may vary by analyte. CBC WITH AUTOMATED DIFF    Collection Time: 12/25/18 10:04 PM   Result Value Ref Range    WBC 6.4 3.6 - 11.0 K/uL    RBC 4.31 3.80 - 5.20 M/uL    HGB 13.9 11.5 - 16.0 g/dL    HCT 44.0 35.0 - 47.0 %    .1 (H) 80.0 - 99.0 FL    MCH 32.3 26.0 - 34.0 PG    MCHC 31.6 30.0 - 36.5 g/dL    RDW 12.8 11.5 - 14.5 %    PLATELET 224 528 - 035 K/uL    MPV 10.0 8.9 - 12.9 FL    NRBC 0.0 0  WBC    ABSOLUTE NRBC 0.00 0.00 - 0.01 K/uL    NEUTROPHILS 50 32 - 75 %    LYMPHOCYTES 41 12 - 49 %    MONOCYTES 6 5 - 13 %    EOSINOPHILS 2 0 - 7 %    BASOPHILS 1 0 - 1 %    IMMATURE GRANULOCYTES 0 0.0 - 0.5 %    ABS. NEUTROPHILS 3.2 1.8 - 8.0 K/UL    ABS. LYMPHOCYTES 2.6 0.8 - 3.5 K/UL    ABS. MONOCYTES 0.4 0.0 - 1.0 K/UL    ABS. EOSINOPHILS 0.1 0.0 - 0.4 K/UL    ABS. BASOPHILS 0.0 0.0 - 0.1 K/UL    ABS. IMM.  GRANS. 0.0 0.00 - 0.04 K/UL    DF AUTOMATED     METABOLIC PANEL, COMPREHENSIVE    Collection Time: 12/25/18 10:04 PM   Result Value Ref Range    Sodium 144 136 - 145 mmol/L    Potassium 4.5 3.5 - 5.1 mmol/L    Chloride 110 (H) 97 - 108 mmol/L    CO2 24 21 - 32 mmol/L    Anion gap 10 5 - 15 mmol/L    Glucose 65 65 - 100 mg/dL    BUN 35 (H) 6 - 20 MG/DL    Creatinine 1.23 (H) 0.55 - 1.02 MG/DL    BUN/Creatinine ratio 28 (H) 12 - 20      GFR est AA 54 (L) >60 ml/min/1.73m2    GFR est non-AA 45 (L) >60 ml/min/1.73m2    Calcium 9.1 8.5 - 10.1 MG/DL    Bilirubin, total 0.3 0.2 - 1.0 MG/DL    ALT (SGPT) 21 12 - 78 U/L    AST (SGOT) 14 (L) 15 - 37 U/L    Alk. phosphatase 91 45 - 117 U/L    Protein, total 6.8 6.4 - 8.2 g/dL    Albumin 3.2 (L) 3.5 - 5.0 g/dL    Globulin 3.6 2.0 - 4.0 g/dL    A-G Ratio 0.9 (L) 1.1 - 2.2         Xr Spine Lumb 2 Or 3 V    Result Date: 12/25/2018  CLINICAL HISTORY: Low back pain INDICATION: Low back pain COMPARISON: 4/23/2015 FINDINGS: Three views of the lumbar spine are obtained. The vertebral bodies are anatomically aligned. There is facet arthropathy and hypertrophy at L4-5 and L5-S1. Loss of intervertebral disc height at L5-S1. Disc bulge/osteophyte at L5-S1 as well. Likely canal and foraminal stenosis at L5-S1. Likely foraminal stenosis at L4-5 as well. Aortic atherosclerotic change. . Visualized osseous structures are without evidence of acute fracture-dislocation. Minimal chronic compression at L1 and L2. There is no significant soft tissue abnormality identified. IMPRESSION: No acute fracture or dislocation.

## 2018-12-26 NOTE — ED PROVIDER NOTES
Nasim Zuniga is a 60 y.o. female  who presents by EMS to ER with c/o Patient presents with:  Fall  Patient presents by EMS after fall. Patient in knee immobilizer and using walker after fall yesterday causing her to have right knee effusion. Patient reports the walker got caught up in her oxygen tubing and patient fell backwards landing on her buttocks. Patient denies head injury or LOC. Patient with BS of 50 by EMS. Patient reports being out of her psych medications for a few weeks. Patient reports feeling depressed however denies suicidal or homicidal ideation. She specifically denies any fevers, chills, nausea, vomiting, chest pain, shortness of breath, headache, rash, diarrhea, abdominal pain, urinary/bowel changes, sweating or weight loss. PCP: Kisha Roberto MD   PMHx significant for: Past Medical History:  7/4/2014: ARF (acute renal failure) ([facility] [address]  No date: Arthritis  No date: Cancer [facility])      Comment:  cervical and labia cancer  No date: Carpal tunnel syndrome  No date: Chronic pain  No date: COPD  No date: DDD (degenerative disc disease)  7/4/2014: Depression  No date: Fibromyalgia  No date: Gastrointestinal disorder      Comment:  ischemic bowel  No date: Ill-defined condition      Comment:  ischemic colitis 2014  No date: Psychiatric disorder      Comment:  anxiety   PSHx significant for: Past Surgical History:  No date: HX CARPAL TUNNEL RELEASE  No date: HX GI      Comment:  ileostomy July 2014  hysterectomy age 23: HX GYN  No date: HX HEENT      Comment:  tmj  No date: HX ORTHOPAEDIC      Comment:  neck surgery  No date: HX ORTHOPAEDIC      Comment:  knee surgery  No date: HX OTHER SURGICAL  Social Hx: Tobacco use: Social History    Tobacco Use      Smoking status: Current Every Day Smoker        Packs/day: 0.25      Smokeless tobacco: Never Used      Tobacco comment: 3 a day  ; EtOH use: The patient states she drinks 0 per week.; Illicit Drug use:      Allergies:   -- Codeine -- Anaphylaxis and Rash   -- Mucinex (Guaifenesin) -- Anaphylaxis   -- Morphine -- Nausea and Vomiting    --  Patient reports this drug \"makes her heart go             crazy. \"   -- Oxycodone -- Itching    --  Mouth, lips, skin itching. Patient can take             Percocet   -- Pcn (Penicillins) -- Palpitations   -- Sulfa (Sulfonamide Antibiotics) -- Nausea Only   -- Tramadol -- Nausea and Vomiting   -- Aspirin -- Other (comments)    --  Pt has ulcers. PT REPORTS TAKES REGULARLY   -- Gabapentin -- Nausea and Vomiting   -- Trazodone -- Other (comments)    --  Restless leg syndrome   -- Pamelor (Nortriptyline) -- Other (comments)    --  Restless legs    There are no other complaints, changes or physical findings at this time.                 Past Medical History:   Diagnosis Date    ARF (acute renal failure) ([facility] [address] 7/4/2014    Arthritis     Cancer (HCC)     cervical and labia cancer    Carpal tunnel syndrome     Chronic pain     COPD     DDD (degenerative disc disease)     Depression 7/4/2014    Fibromyalgia     Gastrointestinal disorder     ischemic bowel    Ill-defined condition     ischemic colitis 2014    Psychiatric disorder     anxiety       Past Surgical History:   Procedure Laterality Date    HX CARPAL TUNNEL RELEASE      HX GI      ileostomy July 2014    HX GYN  hysterectomy age 23    HX HEENT      tmj    HX ORTHOPAEDIC      neck surgery    HX ORTHOPAEDIC      knee surgery    HX OTHER SURGICAL           Family History:   Problem Relation Age of Onset    Heart Disease Mother     Heart Disease Father     Alcohol abuse Neg Hx     Arthritis-rheumatoid Neg Hx     Asthma Neg Hx     Bleeding Prob Neg Hx     Cancer Neg Hx     Diabetes Neg Hx     Elevated Lipids Neg Hx     Headache Neg Hx     Hypertension Neg Hx     Lung Disease Neg Hx     Migraines Neg Hx     Psychiatric Disorder Neg Hx     Stroke Neg Hx     Mental Retardation Neg Hx        Social History     Socioeconomic History    Marital status: LEGALLY      Spouse name: Not on file    Number of children: Not on file    Years of education: Not on file    Highest education level: Not on file   Social Needs    Financial resource strain: Not on file    Food insecurity - worry: Not on file    Food insecurity - inability: Not on file    Transportation needs - medical: Not on file   [employer] needs - non-medical: Not on file   Occupational History    Not on file   Tobacco Use    Smoking status: Current Every Day Smoker     Packs/day: 0.25    Smokeless tobacco: Never Used    Tobacco comment: 3 a day   Substance and Sexual Activity    Alcohol use: No    Drug use: No    Sexual activity: Yes     Partners: Male   Other Topics Concern    Not on file   Social History Narrative    Not on file         ALLERGIES: Codeine; Mucinex [guaifenesin]; Morphine; Oxycodone; Pcn [penicillins]; Sulfa (sulfonamide antibiotics); Tramadol; Aspirin; Gabapentin; Trazodone; and Pamelor [nortriptyline]    Review of Systems   Constitutional: Negative for activity change, chills and fever. HENT: Negative for congestion, rhinorrhea and sore throat. Respiratory: Negative for shortness of breath. Cardiovascular: Negative for chest pain and leg swelling. Gastrointestinal: Negative for abdominal pain, diarrhea, nausea and vomiting. Genitourinary: Negative for dysuria, vaginal bleeding and vaginal discharge. Musculoskeletal: Negative for arthralgias and myalgias. Neurological: Negative for dizziness. Psychiatric/Behavioral: Negative for confusion. All other systems reviewed and are negative. Vitals:    12/25/18 2152   BP: (!) 133/96   Pulse: 81   Resp: 16   Temp: 97.6 °F (36.4 °C)   SpO2: 98%   Weight: 49.4 kg (109 lb)   Height: 5' 5\" (1.651 m)            Physical Exam   Constitutional: She is oriented to person, place, and time. She appears well-developed. HENT:   Head: Normocephalic and atraumatic.    Right Ear: External ear normal.   Left Ear: External ear normal.   Nose: Nose normal.   Mouth/Throat: Oropharynx is clear and moist. No oropharyngeal exudate. Eyes: Conjunctivae, EOM and lids are normal. Right eye exhibits no discharge. Left eye exhibits no discharge. Neck: Normal range of motion. No tracheal deviation present. No thyromegaly present. Cardiovascular: Normal rate, regular rhythm, normal heart sounds and intact distal pulses. Pulmonary/Chest: Effort normal and breath sounds normal.   Abdominal: Soft. Normal appearance and bowel sounds are normal.   Musculoskeletal: Normal range of motion. Lumbar back: She exhibits tenderness. Spine palpated with out step off or crepitus. Non-TTP over spinous process, mild tenderness over paraspinal muscles. Full ROM to all extremities. All extremities are NVI. Patient ambulatory to exam room with out difficulty. RLE is in knee immobilizer   Neurological: She is alert and oriented to person, place, and time. Skin: Skin is warm and dry. Psychiatric: Her speech is normal. Judgment normal. She exhibits a depressed mood. She expresses no homicidal and no suicidal ideation. She expresses no suicidal plans and no homicidal plans. MDM  Number of Diagnoses or Management Options  Fall, initial encounter:   Hypoglycemia:   Diagnosis management comments: Assesment/Plan- 60 y.o. Patient presents with:  Fall  differential includes: back pain, muscle strain, malingering, fracture. Labs and imaging reviewed with hypoglycemia, improved after food in ED. Patient requesting refills of medications including, zoloft, lithium, clonidine, diazepam and hydrocodone. Discussed with patient that I would not refill narcotic or benzodiazepines medications. Recommend PCP follow up. Patient educated on reasons to return to the ED. Procedures             1:03 AM  Patient ambulatory from ED with walker, patient using walker with no difficulty.

## 2018-12-26 NOTE — ED TRIAGE NOTES
Patient via EMS from home for a fall. Patient was using her walker and got caught up in her oxygen tubing and fell backwards onto the fall. Denies hitting head. BG 50 en route, 1 oral glcouse given by EMS repeat . Given 50mg IM toradol enroute. Patient uses 3L 02 at home. Per EMS the patient reported she has not eaten in 2 days because she has been depressed and she ran out of her depression medication. Has not taken meds in a month. Patient has a brace on her right leg, was seen in the ED yesterday for a fall.

## 2020-07-26 ENCOUNTER — APPOINTMENT (OUTPATIENT)
Dept: GENERAL RADIOLOGY | Age: 60
End: 2020-07-26
Attending: EMERGENCY MEDICINE
Payer: MEDICAID

## 2020-07-26 ENCOUNTER — HOSPITAL ENCOUNTER (EMERGENCY)
Age: 60
Discharge: HOME | End: 2020-07-26
Attending: EMERGENCY MEDICINE
Payer: MEDICAID

## 2020-07-26 VITALS
BODY MASS INDEX: 17.89 KG/M2 | HEART RATE: 95 BPM | HEIGHT: 65 IN | DIASTOLIC BLOOD PRESSURE: 99 MMHG | TEMPERATURE: 97.5 F | OXYGEN SATURATION: 97 % | WEIGHT: 107.36 LBS | SYSTOLIC BLOOD PRESSURE: 142 MMHG | RESPIRATION RATE: 20 BRPM

## 2020-07-26 DIAGNOSIS — M79.641 RIGHT HAND PAIN: Primary | ICD-10-CM

## 2020-07-26 DIAGNOSIS — M79.89 SWELLING OF RIGHT HAND: ICD-10-CM

## 2020-07-26 LAB
ANION GAP SERPL CALC-SCNC: 15 MMOL/L (ref 5–15)
BASOPHILS # BLD: 0.1 K/UL (ref 0–0.1)
BASOPHILS NFR BLD: 1 % (ref 0–1)
BUN SERPL-MCNC: 27 MG/DL (ref 6–20)
BUN/CREAT SERPL: 16 (ref 12–20)
CALCIUM SERPL-MCNC: 9.3 MG/DL (ref 8.5–10.1)
CHLORIDE SERPL-SCNC: 104 MMOL/L (ref 97–108)
CO2 SERPL-SCNC: 19 MMOL/L (ref 21–32)
CREAT SERPL-MCNC: 1.7 MG/DL (ref 0.55–1.02)
CRP SERPL-MCNC: 0.38 MG/DL (ref 0–0.6)
DIFFERENTIAL METHOD BLD: ABNORMAL
EOSINOPHIL # BLD: 0.1 K/UL (ref 0–0.4)
EOSINOPHIL NFR BLD: 1 % (ref 0–7)
ERYTHROCYTE [DISTWIDTH] IN BLOOD BY AUTOMATED COUNT: 13.6 % (ref 11.5–14.5)
ERYTHROCYTE [SEDIMENTATION RATE] IN BLOOD: 4 MM/HR (ref 0–30)
GLUCOSE SERPL-MCNC: 98 MG/DL (ref 65–100)
HCT VFR BLD AUTO: 47.5 % (ref 35–47)
HGB BLD-MCNC: 15.9 G/DL (ref 11.5–16)
IMM GRANULOCYTES # BLD AUTO: 0 K/UL (ref 0–0.04)
IMM GRANULOCYTES NFR BLD AUTO: 0 % (ref 0–0.5)
LYMPHOCYTES # BLD: 3.3 K/UL (ref 0.8–3.5)
LYMPHOCYTES NFR BLD: 41 % (ref 12–49)
MCH RBC QN AUTO: 31.1 PG (ref 26–34)
MCHC RBC AUTO-ENTMCNC: 33.5 G/DL (ref 30–36.5)
MCV RBC AUTO: 93 FL (ref 80–99)
MONOCYTES # BLD: 0.6 K/UL (ref 0–1)
MONOCYTES NFR BLD: 8 % (ref 5–13)
NEUTS SEG # BLD: 4 K/UL (ref 1.8–8)
NEUTS SEG NFR BLD: 49 % (ref 32–75)
NRBC # BLD: 0 K/UL (ref 0–0.01)
NRBC BLD-RTO: 0 PER 100 WBC
PLATELET # BLD AUTO: 235 K/UL (ref 150–400)
PMV BLD AUTO: 10 FL (ref 8.9–12.9)
POTASSIUM SERPL-SCNC: 3.8 MMOL/L (ref 3.5–5.1)
RBC # BLD AUTO: 5.11 M/UL (ref 3.8–5.2)
RBC MORPH BLD: ABNORMAL
SODIUM SERPL-SCNC: 138 MMOL/L (ref 136–145)
WBC # BLD AUTO: 8.1 K/UL (ref 3.6–11)

## 2020-07-26 PROCEDURE — 80048 BASIC METABOLIC PNL TOTAL CA: CPT

## 2020-07-26 PROCEDURE — 85652 RBC SED RATE AUTOMATED: CPT

## 2020-07-26 PROCEDURE — 99284 EMERGENCY DEPT VISIT MOD MDM: CPT

## 2020-07-26 PROCEDURE — 96375 TX/PRO/DX INJ NEW DRUG ADDON: CPT

## 2020-07-26 PROCEDURE — 74011250636 HC RX REV CODE- 250/636: Performed by: PHYSICIAN ASSISTANT

## 2020-07-26 PROCEDURE — 36415 COLL VENOUS BLD VENIPUNCTURE: CPT

## 2020-07-26 PROCEDURE — 74011250637 HC RX REV CODE- 250/637: Performed by: PHYSICIAN ASSISTANT

## 2020-07-26 PROCEDURE — 73130 X-RAY EXAM OF HAND: CPT

## 2020-07-26 PROCEDURE — 86140 C-REACTIVE PROTEIN: CPT

## 2020-07-26 PROCEDURE — 85025 COMPLETE CBC W/AUTO DIFF WBC: CPT

## 2020-07-26 PROCEDURE — 74011250636 HC RX REV CODE- 250/636: Performed by: EMERGENCY MEDICINE

## 2020-07-26 PROCEDURE — 96374 THER/PROPH/DIAG INJ IV PUSH: CPT

## 2020-07-26 RX ORDER — BACLOFEN 10 MG/1
10 TABLET ORAL 3 TIMES DAILY
Qty: 15 TAB | Refills: 0 | Status: SHIPPED | OUTPATIENT
Start: 2020-07-26 | End: 2020-07-31

## 2020-07-26 RX ORDER — HYDROCODONE BITARTRATE AND ACETAMINOPHEN 7.5; 325 MG/1; MG/1
1 TABLET ORAL
Status: COMPLETED | OUTPATIENT
Start: 2020-07-26 | End: 2020-07-26

## 2020-07-26 RX ORDER — SODIUM CHLORIDE 9 MG/ML
500 INJECTION, SOLUTION INTRAVENOUS CONTINUOUS
Status: DISCONTINUED | OUTPATIENT
Start: 2020-07-26 | End: 2020-07-26 | Stop reason: HOSPADM

## 2020-07-26 RX ORDER — FENTANYL CITRATE 50 UG/ML
50 INJECTION, SOLUTION INTRAMUSCULAR; INTRAVENOUS ONCE
Status: DISCONTINUED | OUTPATIENT
Start: 2020-07-26 | End: 2020-07-26

## 2020-07-26 RX ORDER — HYDROCODONE BITARTRATE AND ACETAMINOPHEN 5; 325 MG/1; MG/1
1 TABLET ORAL
Qty: 5 TAB | Refills: 0 | Status: SHIPPED | OUTPATIENT
Start: 2020-07-26 | End: 2020-07-29

## 2020-07-26 RX ORDER — ACETAMINOPHEN 500 MG
1000 TABLET ORAL
COMMUNITY
End: 2022-10-15

## 2020-07-26 RX ORDER — KETOROLAC TROMETHAMINE 30 MG/ML
15 INJECTION, SOLUTION INTRAMUSCULAR; INTRAVENOUS
Status: COMPLETED | OUTPATIENT
Start: 2020-07-26 | End: 2020-07-26

## 2020-07-26 RX ORDER — FENTANYL CITRATE 50 UG/ML
25 INJECTION, SOLUTION INTRAMUSCULAR; INTRAVENOUS
Status: COMPLETED | OUTPATIENT
Start: 2020-07-26 | End: 2020-07-26

## 2020-07-26 RX ADMIN — KETOROLAC TROMETHAMINE 15 MG: 30 INJECTION, SOLUTION INTRAMUSCULAR at 13:33

## 2020-07-26 RX ADMIN — SODIUM CHLORIDE 500 ML: 900 INJECTION, SOLUTION INTRAVENOUS at 13:33

## 2020-07-26 RX ADMIN — HYDROCODONE BITARTRATE AND ACETAMINOPHEN 1 TABLET: 7.5; 325 TABLET ORAL at 14:15

## 2020-07-26 RX ADMIN — FENTANYL CITRATE 25 MCG: 50 INJECTION INTRAMUSCULAR; INTRAVENOUS at 11:56

## 2020-07-26 NOTE — ED TRIAGE NOTES
Pt presents to ED with c/o pain and swelling in right hand. Pt was working on her auto yesterday and caught her hand in the engine compartment. The vehicle was not running. She pulled her hand out and is experiencing pain in the hand. There is redness and mild swelling noted.

## 2020-07-26 NOTE — ED PROVIDER NOTES
Date of Service:  7/26/2020    Patient:  Cyndi Loya    Chief Complaint:  Hand Pain       HPI:  Cyndi Loya is a 63 y.o.  female who presents for evaluation of right hand pain. Right-hand-dominant female. Patient was working on her [employer] yesterday when she states that she got her hand caught in the engine. The engine was not running and nothing was moving. She states she had to twist her arm and pull on it very hard to get it out. This was last evening. She states she took 2 Tylenol with no relief. She comes in with 10 out of 10 right hand pain. She states she is unable to move it because it hurts so much. No fevers or chills. No wrist pain. No pain anywhere else. She denies having any type of chemicals on her hand other than some motor oil. Otherwise no other acute complaints.            Past Medical History:   Diagnosis Date    ARF (acute renal failure) ([facility] [address] 7/4/2014    Arthritis     Cancer (HCC)     cervical and labia cancer    Carpal tunnel syndrome     Chronic pain     COPD     DDD (degenerative disc disease)     Depression 7/4/2014    Fibromyalgia     Gastrointestinal disorder     ischemic bowel    Ill-defined condition     ischemic colitis 2014    Psychiatric disorder     anxiety       Past Surgical History:   Procedure Laterality Date    HX CARPAL TUNNEL RELEASE      HX GI      ileostomy July 2014    HX GYN  hysterectomy age 23    HX HEENT      tmj    HX ORTHOPAEDIC      neck surgery    HX ORTHOPAEDIC      knee surgery    HX OTHER SURGICAL           Family History:   Problem Relation Age of Onset    Heart Disease Mother     Heart Disease Father     Alcohol abuse Neg Hx     Arthritis-rheumatoid Neg Hx     Asthma Neg Hx     Bleeding Prob Neg Hx     Cancer Neg Hx     Diabetes Neg Hx     Elevated Lipids Neg Hx     Headache Neg Hx     Hypertension Neg Hx     Lung Disease Neg Hx     Migraines Neg Hx     Psychiatric Disorder Neg Hx     Stroke Neg Hx     Mental Retardation Neg Hx        Social History     Socioeconomic History    Marital status: LEGALLY      Spouse name: Not on file    Number of children: Not on file    Years of education: Not on file    Highest education level: Not on file   Occupational History    Not on file   Social Needs    Financial resource strain: Not on file    Food insecurity     Worry: Not on file     Inability: Not on file    Transportation needs     Medical: Not on file     Non-medical: Not on file   Tobacco Use    Smoking status: Current Some Day Smoker     Packs/day: 0.25    Smokeless tobacco: Never Used    Tobacco comment: 3 a day   Substance and Sexual Activity    Alcohol use: No    Drug use: No     Types: Marijuana    Sexual activity: Yes     Partners: Male   Lifestyle    Physical activity     Days per week: Not on file     Minutes per session: Not on file    Stress: Not on file   Relationships    Social connections     Talks on phone: Not on file     Gets together: Not on file     Attends Orthodox service: Not on file     Active member of club or organization: Not on file     Attends meetings of clubs or organizations: Not on file     Relationship status: Not on file    Intimate partner violence     Fear of current or ex partner: Not on file     Emotionally abused: Not on file     Physically abused: Not on file     Forced sexual activity: Not on file   Other Topics Concern    Not on file   Social History Narrative    Not on file         ALLERGIES: Codeine; Mucinex [guaifenesin]; Morphine; Oxycodone; Pcn [penicillins]; Sulfa (sulfonamide antibiotics); Tramadol; Aspirin; Gabapentin; Trazodone; and Pamelor [nortriptyline]    Review of Systems   Constitutional: Negative for fever. HENT: Negative for hearing loss. Eyes: Negative for visual disturbance. Respiratory: Negative for shortness of breath. Cardiovascular: Negative for chest pain. Gastrointestinal: Negative for abdominal pain.    Genitourinary: Negative for flank pain. Musculoskeletal: Positive for joint swelling. Negative for back pain. Skin: Positive for color change. Negative for rash. Neurological: Negative for dizziness and light-headedness. Psychiatric/Behavioral: Negative for confusion. Vitals:    07/26/20 1020   BP: 109/69   Pulse: 95   Resp: 20   Temp: 97.5 °F (36.4 °C)   SpO2: 95%   Weight: 48.7 kg (107 lb 5.8 oz)   Height: 5' 5\" (1.651 m)            Physical Exam  Vitals signs and nursing note reviewed. Constitutional:       General: She is in acute distress. Appearance: Normal appearance. She is not ill-appearing, toxic-appearing or diaphoretic. HENT:      Head: Normocephalic and atraumatic. Nose: Nose normal.   Eyes:      General: No scleral icterus. Cardiovascular:      Rate and Rhythm: Normal rate. Heart sounds: No murmur. Pulmonary:      Effort: Pulmonary effort is normal. No respiratory distress. Abdominal:      General: Abdomen is flat. Musculoskeletal:      Comments: Right hand swelling. Pain with passive and active movement. Fingers all swollen. No obvious warmth to hand compared to left. Swelling, tenderness throughout. Skin:     General: Skin is warm. Capillary Refill: Capillary refill takes less than 2 seconds. Neurological:      Mental Status: She is alert and oriented to person, place, and time.    Psychiatric:         Mood and Affect: Mood normal.          MDM  Number of Diagnoses or Management Options  Right hand pain:   Swelling of right hand:      Amount and/or Complexity of Data Reviewed  Decide to obtain previous medical records or to obtain history from someone other than the patient: yes          VITAL SIGNS:  Patient Vitals for the past 4 hrs:   BP SpO2   07/26/20 1330 (!) 142/99 97 %   07/26/20 1300 (!) 128/97 (!) 88 %   07/26/20 1200 (!) 144/101 96 %         LABS:  Recent Results (from the past 6 hour(s))   CBC WITH AUTOMATED DIFF    Collection Time: 07/26/20 11:17 AM Result Value Ref Range    WBC 8.1 3.6 - 11.0 K/uL    RBC 5.11 3.80 - 5.20 M/uL    HGB 15.9 11.5 - 16.0 g/dL    HCT 47.5 (H) 35.0 - 47.0 %    MCV 93.0 80.0 - 99.0 FL    MCH 31.1 26.0 - 34.0 PG    MCHC 33.5 30.0 - 36.5 g/dL    RDW 13.6 11.5 - 14.5 %    PLATELET 996 760 - 114 K/uL    MPV 10.0 8.9 - 12.9 FL    NRBC 0.0 0.0  WBC    ABSOLUTE NRBC 0.00 0.00 - 0.01 K/uL    NEUTROPHILS 49 32 - 75 %    LYMPHOCYTES 41 12 - 49 %    MONOCYTES 8 5 - 13 %    EOSINOPHILS 1 0 - 7 %    BASOPHILS 1 0 - 1 %    IMMATURE GRANULOCYTES 0 0 - 0.5 %    ABS. NEUTROPHILS 4.0 1.8 - 8.0 K/UL    ABS. LYMPHOCYTES 3.3 0.8 - 3.5 K/UL    ABS. MONOCYTES 0.6 0.0 - 1.0 K/UL    ABS. EOSINOPHILS 0.1 0.0 - 0.4 K/UL    ABS. BASOPHILS 0.1 0.0 - 0.1 K/UL    ABS. IMM. GRANS. 0.0 0.00 - 0.04 K/UL    DF SMEAR SCANNED      RBC COMMENTS NORMOCYTIC, NORMOCHROMIC     METABOLIC PANEL, BASIC    Collection Time: 07/26/20 11:17 AM   Result Value Ref Range    Sodium 138 136 - 145 mmol/L    Potassium 3.8 3.5 - 5.1 mmol/L    Chloride 104 97 - 108 mmol/L    CO2 19 (L) 21 - 32 mmol/L    Anion gap 15 5 - 15 mmol/L    Glucose 98 65 - 100 mg/dL    BUN 27 (H) 6 - 20 MG/DL    Creatinine 1.70 (H) 0.55 - 1.02 MG/DL    BUN/Creatinine ratio 16 12 - 20      GFR est AA 37 (L) >60 ml/min/1.73m2    GFR est non-AA 31 (L) >60 ml/min/1.73m2    Calcium 9.3 8.5 - 10.1 MG/DL   SED RATE (ESR)    Collection Time: 07/26/20 11:17 AM   Result Value Ref Range    Sed rate, automated 4 0 - 30 mm/hr   C REACTIVE PROTEIN, QT    Collection Time: 07/26/20 11:17 AM   Result Value Ref Range    C-Reactive protein 0.38 0.00 - 0.60 mg/dL        IMAGING:  XR HAND RT MIN 3 V   Final Result   IMPRESSION: No acute abnormality.             Medications During Visit:  Medications   0.9% sodium chloride infusion 500 mL (0 mL IntraVENous IV Completed 7/26/20 1400)   fentaNYL citrate (PF) injection 25 mcg (25 mcg IntraVENous Given 7/26/20 1156)   ketorolac (TORADOL) injection 15 mg (15 mg IntraVENous Given 7/26/20 1333)   HYDROcodone-acetaminophen (NORCO) 7.5-325 mg per tablet 1 Tab (1 Tab Oral Given 7/26/20 1415)         DECISION MAKING:  Mojgan Schumacher is a 63 y.o. female who comes in as above. Here, patient seems to have discomfort isolated to the right hand. Imaging and blood work is unremarkable. At this time I did reach out to orthopedics given the amount of discomfort that the patient was having. Orthopedics has been seen and evaluated this patient. After discussing the case with orthopedics the joint decision to discharge the patient home on the medications listed have been made. Orthopedics also ordered some baclofen. Patient is to follow-up promptly tomorrow morning with a hand specialist.  Orthopedics has placed a splint on the patient's hand. Patient is urged to follow-up and return as directed and. At disposition, patient is agreeable to this plan. All questions answered. IMPRESSION:  1. Right hand pain    2. Swelling of right hand        DISPOSITION:  Discharged      Current Discharge Medication List      START taking these medications    Details   baclofen (LIORESAL) 10 mg tablet Take 1 Tab by mouth three (3) times daily for 5 days. Qty: 15 Tab, Refills: 0      !! HYDROcodone-acetaminophen (Norco) 5-325 mg per tablet Take 1 Tab by mouth every four (4) hours as needed for Pain for up to 3 days. Max Daily Amount: 6 Tabs. Qty: 5 Tab, Refills: 0    Associated Diagnoses: Right hand pain; Swelling of right hand       !! - Potential duplicate medications found. Please discuss with provider. CONTINUE these medications which have NOT CHANGED    Details   !! HYDROcodone-acetaminophen (NORCO) 5-325 mg per tablet Take 1 Tab by mouth every four (4) hours as needed for Pain. Max Daily Amount: 6 Tabs. Qty: 10 Tab, Refills: 0    Associated Diagnoses: Hematoma; Contusion of right hand, initial encounter       !! - Potential duplicate medications found. Please discuss with provider. Follow-up Information     Follow up With Specialties Details Why Contact Info    Meryl Roman MD Orthopedic Surgery Call tomorrow - Monday-- 7/27 [address] [place] [id number] [phone]      Maricarmen Bedolla MD Family Medicine Schedule an appointment as soon as possible for a visit   [facility]  [phone]              The patient is asked to follow-up with their primary care provider in the next several days. They are to call tomorrow for an appointment. The patient is asked to return promptly for any increased concerns or worsening of symptoms. They can return to this emergency department or any other emergency department.       Procedures

## 2020-07-26 NOTE — DISCHARGE INSTRUCTIONS
Patient Education     Elevate your hand on several pillows. Apply ice 10-15 min ever three hours, while awake     Loosen the ACE wrap or remove the splint entirely if your develop severe worsening pain, your fingers go numb, or tingling, capillary refill to the finger tips is less than 3 secs, or you lose the ability to move your fingers. If your remove the splint and your symptoms persist return to the ER     Follow up Dr Satya Frost-- hand and wrist specialist --Tomorrow Monday 7/27---- call first thing Monday morning for appt- [phone]         Wearing a Splint: Care Instructions  Your Care Instructions     A splint protects a broken bone or other injury. If you have a removable splint, follow your doctor's instructions and only remove the splint if your doctor says it's okay. Most splints can be adjusted. Your doctor will show you how to do this and will tell you when you might need to adjust the splint. A splint is sometimes called a brace. You may also hear it called an immobilizer. An immobilizer, such as a splint or cast, keeps you from moving the injured area. You may get a splint that's already factory-made. Or your doctor might make your splint from plaster or fiberglass. Some splints have a built-in air cushion. Air pads are inflated to hold the injured area in place. Follow-up care is a key part of your treatment and safety. Be sure to make and go to all appointments, and call your doctor if you are having problems. It's also a good idea to know your test results and keep a list of the medicines you take. How can you care for yourself at home? General care  · Follow your doctor's instructions on how much weight you can put on your injured limb. · If the fingers or toes on the limb with the splint were not injured, wiggle them every now and then. This helps move the blood and fluids in the injured limb.   · Prop up the injured limb on a pillow when you ice it or anytime you sit or lie down during the next 3 days. Try to keep it above the level of your heart. This will help reduce swelling. · Put ice or cold packs on the limb for 10 to 20 minutes at a time. Try to do this every 1 to 2 hours for the next 3 days (when you are awake) or until the swelling goes down. Be careful not to get the splint wet. Put a thin cloth between the ice and your skin. If your splint is removable, ask your doctor if you can take it off when you use ice. · If you have an adjustable splint that feels too tight, loosen it slightly. · Keep up your muscle strength and tone as much as you can while protecting your injured limb. Your doctor may want you to tense and relax the muscles protected by the splint. Check with your doctor or your physical or occupational therapist for instructions. Splint and skin care  · If your splint is not to be removed, try blowing cool air from a hair dryer or fan into the splint to help relieve itching. Never stick items under your splint to scratch the skin. · Do not use oils or lotions near your splint. If the skin becomes red or sore around the edge of the splint, you may pad the edges with a soft material, such as moleskin, or use tape to cover the edges. · If you're allowed to take your splint off, be sure your skin is dry before you put it back on. Be careful not to put the splint on too tightly. · Check the skin under the splint every day. If you can't remove the splint, check the skin around the edges. Tell your doctor if you see redness or sores. Water and your splint  · Keep your splint dry. Moisture can collect under the splint and cause skin irritation and itching. If you have a wound or have had surgery, moisture under the splint can increase the risk of infection. · Tape a sheet of plastic to cover your splint when you take a shower or bath, unless your doctor said you can take it off while bathing.   · If you can take the splint off when you bathe, pat the area dry after bathing and put the splint back on.  · If your splint gets a little wet, you can dry it with a hair dryer. Use a \"cool\" setting. When should you call for help? Call your doctor now or seek immediate medical care if:  · You have increased or severe pain. · You feel a warm or painful spot under the splint. · You have problems with your splint. For example:  ? The skin under the splint is burning or stinging. ? The splint feels too tight. ? There is a lot of swelling near the splint. (Some swelling is normal.)  ? You have a new fever. ? There is drainage or a bad smell coming from the splint. · Your limb turns cold or changes color. · You have trouble moving your fingers or toes. · You have symptoms of a blood clot in your arm or leg (called a deep vein thrombosis). These may include:  ? Pain in the arm, calf, back of the knee, thigh, or groin. ? Redness and swelling in the arm, leg, or groin. Watch closely for changes in your health, and be sure to contact your doctor if:  · The splint is breaking apart or losing its shape. · You are not getting better as expected. Where can you learn more? Go to http://lauryn-dillon.info/  Enter O915 in the search box to learn more about \"Wearing a Splint: Care Instructions. \"  Current as of: March 2, 2020               Content Version: 12.5  © 8243-4602 [employer]. Care instructions adapted under license by [employer] (which disclaims liability or warranty for this information). If you have questions about a medical condition or this instruction, always ask your healthcare professional. [place] [address] any warranty or liability for your use of this information.

## 2020-07-26 NOTE — CONSULTS
ORTHOPAEDIC CONSULT NOTE    Subjective:     Date of Consultation:  July 26, 2020      Kaylen Santacruz is a 63 y.o. female who is being seen for right hand pain. Pt reports injury last night states she got her hand caught in the engine compartment while working on her vehicle. States she had to forcefully pull her hand out. Pain worse over 2nd MCP joint - dorsal aspect---but the entire hand hurts(sans thumb). This afternoon her pain worsened and she states she \"couldn't stand it anymore\". Ambulates at baseline unassisted. Pt. Is right hand dominant.    Pt's son at bedside    Patient Active Problem List    Diagnosis Date Noted    Ischemic colitis ([facility] [address] 07/16/2014    Leukocytosis 07/04/2014    Anxiety disorder 07/04/2014    Depression 07/04/2014    Tobacco abuse 07/04/2014    Esophageal reflux 08/05/2011    COPD (chronic obstructive pulmonary disease) ([facility] [address] 08/05/2011     Family History   Problem Relation Age of Onset    Heart Disease Mother     Heart Disease Father     Alcohol abuse Neg Hx     Arthritis-rheumatoid Neg Hx     Asthma Neg Hx     Bleeding Prob Neg Hx     Cancer Neg Hx     Diabetes Neg Hx     Elevated Lipids Neg Hx     Headache Neg Hx     Hypertension Neg Hx     Lung Disease Neg Hx     Migraines Neg Hx     Psychiatric Disorder Neg Hx     Stroke Neg Hx     Mental Retardation Neg Hx       Social History     Tobacco Use    Smoking status: Current Some Day Smoker     Packs/day: 0.25    Smokeless tobacco: Never Used    Tobacco comment: 3 a day   Substance Use Topics    Alcohol use: No     Past Medical History:   Diagnosis Date    ARF (acute renal failure) (HCC) 7/4/2014    Arthritis     Cancer (HCC)     cervical and labia cancer    Carpal tunnel syndrome     Chronic pain     COPD     DDD (degenerative disc disease)     Depression 7/4/2014    Fibromyalgia     Gastrointestinal disorder     ischemic bowel    Ill-defined condition     ischemic colitis 2014    Psychiatric disorder     anxiety      Past Surgical History:   Procedure Laterality Date    HX CARPAL TUNNEL RELEASE      HX GI      ileostomy July 2014    HX GYN  hysterectomy age 23    HX HEENT      tmj    HX ORTHOPAEDIC      neck surgery    HX ORTHOPAEDIC      knee surgery    HX OTHER SURGICAL        Prior to Admission medications    Medication Sig Start Date End Date Taking? Authorizing Provider   acetaminophen (TYLENOL) 500 mg tablet Take 1,000 mg by mouth every six (6) hours as needed for Pain. Yes Kushal, MD Tim   fluticasone-salmeterol (ADVAIR) 250-50 mcg/dose diskus inhaler Take 1 Puff by inhalation two (2) times a day. 3/10/14  Yes Jeanne Ferrera MD   lithium carbonate 300 mg tablet Take 1 Tab by mouth three (3) times daily. 12/26/18   Lisa Baeza PA   HYDROcodone-acetaminophen (NORCO) 5-325 mg per tablet Take 1 Tab by mouth every four (4) hours as needed for Pain. Max Daily Amount: 6 Tabs. 9/1/18   Lisa Baeza PA   ondansetron (ZOFRAN ODT) 8 mg disintegrating tablet Take 1 Tab by mouth every eight (8) hours as needed for Nausea. 4/21/16   Humberto Ball MD   nystatin (MYCOSTATIN) 100,000 unit/mL suspension Take 5 mL by mouth four (4) times daily. swish and spit 4/21/16   Humberto Ball MD   clonazePAM [facility] 0.5 mg tablet Take 0.5 mg by mouth nightly as needed for Other. Indications: PANIC DISORDER    Provider, Historical   clopidogrel (PLAVIX) 75 mg tablet Take 1 Tab by mouth daily for 30 days. 4/20/16 5/20/16  Richard Garcia MD   esomeprazole (NEXIUM) 20 mg capsule Take  by mouth daily. Other, MD Tim   diazepam (VALIUM) 5 mg tablet Take 1 Tab by mouth every eight (8) hours as needed (spasm). Max Daily Amount: 15 mg. 12/10/15   ALIDA Shabazz   tiotropium [facility]) 18 mcg inhalation capsule Take 1 Cap by inhalation daily. 7/21/14   Katiana Reynolds MD   ranitidine (ZANTAC) 150 mg tablet Take 150 mg by mouth two (2) times a day.     Other, MD Tim   albuterol (PROVENTIL VENTOLIN) 2.5 mg /3 mL (0.083 %) nebulizer solution 3 mL by Nebulization route every four (4) hours as needed for Wheezing or Shortness of Breath. 3/10/14   Jeet Fonseca MD     Current Facility-Administered Medications   Medication Dose Route Frequency    ketorolac (TORADOL) injection 15 mg  15 mg IntraVENous NOW    0.9% sodium chloride infusion 500 mL  500 mL IntraVENous CONTINUOUS     Current Outpatient Medications   Medication Sig    acetaminophen (TYLENOL) 500 mg tablet Take 1,000 mg by mouth every six (6) hours as needed for Pain.  fluticasone-salmeterol (ADVAIR) 250-50 mcg/dose diskus inhaler Take 1 Puff by inhalation two (2) times a day.  lithium carbonate 300 mg tablet Take 1 Tab by mouth three (3) times daily.  HYDROcodone-acetaminophen (NORCO) 5-325 mg per tablet Take 1 Tab by mouth every four (4) hours as needed for Pain. Max Daily Amount: 6 Tabs.  ondansetron (ZOFRAN ODT) 8 mg disintegrating tablet Take 1 Tab by mouth every eight (8) hours as needed for Nausea.  nystatin (MYCOSTATIN) 100,000 unit/mL suspension Take 5 mL by mouth four (4) times daily. swish and spit    clonazePAM (KLONOPIN) 0.5 mg tablet Take 0.5 mg by mouth nightly as needed for Other. Indications: PANIC DISORDER    clopidogrel (PLAVIX) 75 mg tablet Take 1 Tab by mouth daily for 30 days.  esomeprazole (NEXIUM) 20 mg capsule Take  by mouth daily.  diazepam (VALIUM) 5 mg tablet Take 1 Tab by mouth every eight (8) hours as needed (spasm). Max Daily Amount: 15 mg.    tiotropium (SPIRIVA) 18 mcg inhalation capsule Take 1 Cap by inhalation daily.  ranitidine (ZANTAC) 150 mg tablet Take 150 mg by mouth two (2) times a day.  albuterol (PROVENTIL VENTOLIN) 2.5 mg /3 mL (0.083 %) nebulizer solution 3 mL by Nebulization route every four (4) hours as needed for Wheezing or Shortness of Breath.       Allergies   Allergen Reactions    Codeine Anaphylaxis and Rash    Mucinex [Guaifenesin] Anaphylaxis    Morphine Nausea and Vomiting     Patient reports this drug \"makes her heart go crazy. \"     Oxycodone Itching     Mouth, lips, skin itching. Patient can take Percocet    Pcn [Penicillins] Palpitations    Sulfa (Sulfonamide Antibiotics) Nausea Only    Tramadol Nausea and Vomiting    Aspirin Other (comments)     Pt has ulcers. PT REPORTS TAKES REGULARLY    Gabapentin Nausea and Vomiting    Trazodone Other (comments)     Restless leg syndrome    Pamelor [Nortriptyline] Other (comments)     Restless legs          Review of Systems:  A comprehensive review of systems was negative except for that written in the HPI. Mental Status: no dementia    Objective:     Patient Vitals for the past 8 hrs:   BP Temp Pulse Resp SpO2 Height Weight   20 1300 (!) 128/97 -- -- -- (!) 88 % -- --   20 1200 (!) 144/101 -- -- -- 96 % -- --   20 1020 109/69 97.5 °F (36.4 °C) 95 20 95 % 5' 5\" (1.651 m) 48.7 kg (107 lb 5.8 oz)     Temp (24hrs), Av.5 °F (36.4 °C), Min:97.5 °F (36.4 °C), Max:97.5 °F (36.4 °C)      Gen: Well-developed,  in no acute distress, sitting upright on cot, Frail/thin body habitus   HEENT: edentulous, pink conjunctivae, hearing intact to voice, moist mucous membranes   Neck: Supple  Resp: No respiratory distress   Card: RRR, palpable distal pulse-equal bilaterally, birsk cap refill all distal digits   Abd:  non-distended-- right side ostomy  Musc:  Right hand/digits with min swelling, two small areas of dorsal hand ecchymosis, intact flex/ext of the digits(limtied by pain in digits 2-5, ROM approx 10% of the non effected side). Hyperacute TTP throughout the right hand, hand and FA compartment soft/compressible. Intact pro/supination bilat. Skin: discolored nails from years of smoking, callused skin with dirt/grime.     Neuro: Cranial nerves are grossly intact, no focal motor weakness, follows commands appropriately   Psych: anxious, Good insight, oriented to person, place and time, alert                 Imaging Review: EXAM: XR HAND RT MIN 3 V  INDICATION: got hand caught in auto. Now with pain and swelling  COMPARISON: September 2018  FINDINGS: Three views of the right hand demonstrate no fracture or other acute  osseous or articular abnormality. The bones are osteopenic. There are no  radiopaque foreign bodies. IMPRESSION: No acute abnormality. Labs:   Recent Results (from the past 24 hour(s))   CBC WITH AUTOMATED DIFF    Collection Time: 07/26/20 11:17 AM   Result Value Ref Range    WBC 8.1 3.6 - 11.0 K/uL    RBC 5.11 3.80 - 5.20 M/uL    HGB 15.9 11.5 - 16.0 g/dL    HCT 47.5 (H) 35.0 - 47.0 %    MCV 93.0 80.0 - 99.0 FL    MCH 31.1 26.0 - 34.0 PG    MCHC 33.5 30.0 - 36.5 g/dL    RDW 13.6 11.5 - 14.5 %    PLATELET 318 946 - 535 K/uL    MPV 10.0 8.9 - 12.9 FL    NRBC 0.0 0.0  WBC    ABSOLUTE NRBC 0.00 0.00 - 0.01 K/uL    NEUTROPHILS 49 32 - 75 %    LYMPHOCYTES 41 12 - 49 %    MONOCYTES 8 5 - 13 %    EOSINOPHILS 1 0 - 7 %    BASOPHILS 1 0 - 1 %    IMMATURE GRANULOCYTES 0 0 - 0.5 %    ABS. NEUTROPHILS 4.0 1.8 - 8.0 K/UL    ABS. LYMPHOCYTES 3.3 0.8 - 3.5 K/UL    ABS. MONOCYTES 0.6 0.0 - 1.0 K/UL    ABS. EOSINOPHILS 0.1 0.0 - 0.4 K/UL    ABS. BASOPHILS 0.1 0.0 - 0.1 K/UL    ABS. IMM.  GRANS. 0.0 0.00 - 0.04 K/UL    DF SMEAR SCANNED      RBC COMMENTS NORMOCYTIC, NORMOCHROMIC     METABOLIC PANEL, BASIC    Collection Time: 07/26/20 11:17 AM   Result Value Ref Range    Sodium 138 136 - 145 mmol/L    Potassium 3.8 3.5 - 5.1 mmol/L    Chloride 104 97 - 108 mmol/L    CO2 19 (L) 21 - 32 mmol/L    Anion gap 15 5 - 15 mmol/L    Glucose 98 65 - 100 mg/dL    BUN 27 (H) 6 - 20 MG/DL    Creatinine 1.70 (H) 0.55 - 1.02 MG/DL    BUN/Creatinine ratio 16 12 - 20      GFR est AA 37 (L) >60 ml/min/1.73m2    GFR est non-AA 31 (L) >60 ml/min/1.73m2    Calcium 9.3 8.5 - 10.1 MG/DL   SED RATE (ESR)    Collection Time: 07/26/20 11:17 AM   Result Value Ref Range    Sed rate, automated 4 0 - 30 mm/hr   C REACTIVE PROTEIN, QT    Collection Time: 07/26/20 11:17 AM   Result Value Ref Range    C-Reactive protein 0.38 0.00 - 0.60 mg/dL         Impression:     Patient Active Problem List    Diagnosis Date Noted    Ischemic colitis ([facility] [address] 07/16/2014    Leukocytosis 07/04/2014    Anxiety disorder 07/04/2014    Depression 07/04/2014    Tobacco abuse 07/04/2014    Esophageal reflux 08/05/2011    COPD (chronic obstructive pulmonary disease) ([facility] 08/05/2011     Active Problems:    * No active hospital problems. *      Plan:     -  creatinine elevated from baseline likely related to dehydration/diet related - 500cc NACL administered prior to Toradol  previewed with pt need to monitor kidney function with PCP     -  Hyperacute pain, likely mild crush injury, bone bruising - consider CRPS- will start baclofen as she has allergy/sensitivity to Neurontin   -  HX of narcotic/RX abuse-  ED staff will prescribe pain meds  -  No indication of infectious process   -  No indication for compartment syndrome or myoischemia   -  Well padded volar splint was fitted for support and comfort - NV exam intact post splinting  -  Reviewed splint precautions, s/s indication to return to ED  -  Fu with hand specialist Monday/tuesday- Dr Wyatt Frank      1430- pt up to the bathroom independently, NAD      Dr. Tamra Oro aware and agrees with plan as above.         ALIDA Alonzo-C  [employer] Offered, attempted but was not successful

## 2020-12-22 ENCOUNTER — HOSPITAL ENCOUNTER (EMERGENCY)
Age: 60
Discharge: HOME | End: 2020-12-23
Attending: EMERGENCY MEDICINE
Payer: MEDICAID

## 2020-12-22 ENCOUNTER — APPOINTMENT (OUTPATIENT)
Dept: CT IMAGING | Age: 60
End: 2020-12-22
Attending: EMERGENCY MEDICINE
Payer: MEDICAID

## 2020-12-22 DIAGNOSIS — R51.9 NONINTRACTABLE HEADACHE, UNSPECIFIED CHRONICITY PATTERN, UNSPECIFIED HEADACHE TYPE: Primary | ICD-10-CM

## 2020-12-22 DIAGNOSIS — R07.89 CHEST WALL PAIN: ICD-10-CM

## 2020-12-22 LAB
ALBUMIN SERPL-MCNC: 3 G/DL (ref 3.5–5)
ALBUMIN/GLOB SERPL: 0.9 {RATIO} (ref 1.1–2.2)
ALP SERPL-CCNC: 107 U/L (ref 45–117)
ALT SERPL-CCNC: 30 U/L (ref 12–78)
ANION GAP SERPL CALC-SCNC: 3 MMOL/L (ref 5–15)
AST SERPL-CCNC: 32 U/L (ref 15–37)
BASOPHILS # BLD: 0.1 K/UL (ref 0–0.1)
BASOPHILS NFR BLD: 1 % (ref 0–1)
BILIRUB SERPL-MCNC: 0.5 MG/DL (ref 0.2–1)
BUN SERPL-MCNC: 21 MG/DL (ref 6–20)
BUN/CREAT SERPL: 19 (ref 12–20)
CALCIUM SERPL-MCNC: 8.4 MG/DL (ref 8.5–10.1)
CHLORIDE SERPL-SCNC: 114 MMOL/L (ref 97–108)
CO2 SERPL-SCNC: 24 MMOL/L (ref 21–32)
COMMENT, HOLDF: NORMAL
CREAT SERPL-MCNC: 1.09 MG/DL (ref 0.55–1.02)
CRP SERPL-MCNC: <0.29 MG/DL (ref 0–0.6)
DIFFERENTIAL METHOD BLD: NORMAL
EOSINOPHIL # BLD: 0.1 K/UL (ref 0–0.4)
EOSINOPHIL NFR BLD: 1 % (ref 0–7)
ERYTHROCYTE [DISTWIDTH] IN BLOOD BY AUTOMATED COUNT: 13.8 % (ref 11.5–14.5)
ERYTHROCYTE [SEDIMENTATION RATE] IN BLOOD: 4 MM/HR (ref 0–30)
GLOBULIN SER CALC-MCNC: 3.4 G/DL (ref 2–4)
GLUCOSE SERPL-MCNC: 93 MG/DL (ref 65–100)
HCT VFR BLD AUTO: 46.5 % (ref 35–47)
HGB BLD-MCNC: 15.5 G/DL (ref 11.5–16)
IMM GRANULOCYTES # BLD AUTO: 0 K/UL (ref 0–0.04)
IMM GRANULOCYTES NFR BLD AUTO: 0 % (ref 0–0.5)
INR PPP: 0.9 (ref 0.9–1.1)
LYMPHOCYTES # BLD: 2.4 K/UL (ref 0.8–3.5)
LYMPHOCYTES NFR BLD: 38 % (ref 12–49)
MCH RBC QN AUTO: 32.9 PG (ref 26–34)
MCHC RBC AUTO-ENTMCNC: 33.3 G/DL (ref 30–36.5)
MCV RBC AUTO: 98.7 FL (ref 80–99)
MONOCYTES # BLD: 0.4 K/UL (ref 0–1)
MONOCYTES NFR BLD: 7 % (ref 5–13)
NEUTS SEG # BLD: 3.4 K/UL (ref 1.8–8)
NEUTS SEG NFR BLD: 53 % (ref 32–75)
NRBC # BLD: 0 K/UL (ref 0–0.01)
NRBC BLD-RTO: 0 PER 100 WBC
PLATELET # BLD AUTO: 279 K/UL (ref 150–400)
PMV BLD AUTO: 10 FL (ref 8.9–12.9)
POTASSIUM SERPL-SCNC: 4.3 MMOL/L (ref 3.5–5.1)
PROT SERPL-MCNC: 6.4 G/DL (ref 6.4–8.2)
PROTHROMBIN TIME: 9.6 SEC (ref 9–11.1)
RBC # BLD AUTO: 4.71 M/UL (ref 3.8–5.2)
SAMPLES BEING HELD,HOLD: NORMAL
SODIUM SERPL-SCNC: 141 MMOL/L (ref 136–145)
TROPONIN I SERPL-MCNC: <0.05 NG/ML
WBC # BLD AUTO: 6.4 K/UL (ref 3.6–11)

## 2020-12-22 PROCEDURE — 99285 EMERGENCY DEPT VISIT HI MDM: CPT

## 2020-12-22 PROCEDURE — 93005 ELECTROCARDIOGRAM TRACING: CPT

## 2020-12-22 PROCEDURE — 96375 TX/PRO/DX INJ NEW DRUG ADDON: CPT

## 2020-12-22 PROCEDURE — 85652 RBC SED RATE AUTOMATED: CPT

## 2020-12-22 PROCEDURE — 70450 CT HEAD/BRAIN W/O DYE: CPT

## 2020-12-22 PROCEDURE — 80053 COMPREHEN METABOLIC PANEL: CPT

## 2020-12-22 PROCEDURE — 96361 HYDRATE IV INFUSION ADD-ON: CPT

## 2020-12-22 PROCEDURE — 85610 PROTHROMBIN TIME: CPT

## 2020-12-22 PROCEDURE — 84484 ASSAY OF TROPONIN QUANT: CPT

## 2020-12-22 PROCEDURE — 96374 THER/PROPH/DIAG INJ IV PUSH: CPT

## 2020-12-22 PROCEDURE — 86140 C-REACTIVE PROTEIN: CPT

## 2020-12-22 PROCEDURE — 36415 COLL VENOUS BLD VENIPUNCTURE: CPT

## 2020-12-22 PROCEDURE — 85025 COMPLETE CBC W/AUTO DIFF WBC: CPT

## 2020-12-22 PROCEDURE — 74011250637 HC RX REV CODE- 250/637: Performed by: EMERGENCY MEDICINE

## 2020-12-22 PROCEDURE — 74011250636 HC RX REV CODE- 250/636: Performed by: EMERGENCY MEDICINE

## 2020-12-22 RX ORDER — PROCHLORPERAZINE EDISYLATE 5 MG/ML
10 INJECTION INTRAMUSCULAR; INTRAVENOUS
Status: COMPLETED | OUTPATIENT
Start: 2020-12-22 | End: 2020-12-22

## 2020-12-22 RX ORDER — KETOROLAC TROMETHAMINE 30 MG/ML
30 INJECTION, SOLUTION INTRAMUSCULAR; INTRAVENOUS ONCE
Status: COMPLETED | OUTPATIENT
Start: 2020-12-22 | End: 2020-12-22

## 2020-12-22 RX ORDER — DIPHENHYDRAMINE HYDROCHLORIDE 50 MG/ML
50 INJECTION, SOLUTION INTRAMUSCULAR; INTRAVENOUS
Status: COMPLETED | OUTPATIENT
Start: 2020-12-22 | End: 2020-12-22

## 2020-12-22 RX ORDER — GUAIFENESIN 100 MG/5ML
324 LIQUID (ML) ORAL
Status: COMPLETED | OUTPATIENT
Start: 2020-12-22 | End: 2020-12-22

## 2020-12-22 RX ORDER — ACETAMINOPHEN 500 MG
1000 TABLET ORAL ONCE
Status: COMPLETED | OUTPATIENT
Start: 2020-12-22 | End: 2020-12-22

## 2020-12-22 RX ADMIN — KETOROLAC TROMETHAMINE 30 MG: 30 INJECTION, SOLUTION INTRAMUSCULAR at 23:15

## 2020-12-22 RX ADMIN — ACETAMINOPHEN 1000 MG: 500 TABLET ORAL at 22:38

## 2020-12-22 RX ADMIN — PROCHLORPERAZINE EDISYLATE 10 MG: 5 INJECTION INTRAMUSCULAR; INTRAVENOUS at 22:30

## 2020-12-22 RX ADMIN — SODIUM CHLORIDE 1000 ML: 9 INJECTION, SOLUTION INTRAVENOUS at 22:39

## 2020-12-22 RX ADMIN — DIPHENHYDRAMINE HYDROCHLORIDE 50 MG: 50 INJECTION, SOLUTION INTRAMUSCULAR; INTRAVENOUS at 22:28

## 2020-12-22 RX ADMIN — METHYLPREDNISOLONE SODIUM SUCCINATE 240 MG: 125 INJECTION, POWDER, FOR SOLUTION INTRAMUSCULAR; INTRAVENOUS at 22:23

## 2020-12-22 RX ADMIN — ASPIRIN 324 MG: 81 TABLET, CHEWABLE ORAL at 22:26

## 2020-12-23 VITALS
RESPIRATION RATE: 16 BRPM | WEIGHT: 121.3 LBS | SYSTOLIC BLOOD PRESSURE: 142 MMHG | TEMPERATURE: 98 F | DIASTOLIC BLOOD PRESSURE: 89 MMHG | OXYGEN SATURATION: 99 % | HEART RATE: 65 BPM | BODY MASS INDEX: 20.19 KG/M2

## 2020-12-23 LAB
ATRIAL RATE: 72 BPM
CALCULATED P AXIS, ECG09: 52 DEGREES
CALCULATED R AXIS, ECG10: 91 DEGREES
CALCULATED T AXIS, ECG11: 70 DEGREES
DIAGNOSIS, 93000: NORMAL
P-R INTERVAL, ECG05: 112 MS
Q-T INTERVAL, ECG07: 390 MS
QRS DURATION, ECG06: 76 MS
QTC CALCULATION (BEZET), ECG08: 427 MS
TROPONIN I BLD-MCNC: <0.04 NG/ML (ref 0–0.08)
VENTRICULAR RATE, ECG03: 72 BPM

## 2020-12-23 PROCEDURE — 84484 ASSAY OF TROPONIN QUANT: CPT

## 2020-12-23 NOTE — ED PROVIDER NOTES
In addition to her symptoms patient reports stopping all her medications approximately one year ago. The history is provided by the patient. Headache   This is a new problem. The current episode started yesterday. The problem occurs constantly. The problem has been gradually worsening. The headache is aggravated by nothing. The pain is located in the right unilateral region. The pain is at a severity of 10/10. The pain is severe. Associated symptoms include anorexia, chest pressure, shortness of breath, weakness, dizziness, nausea and vomiting. Pertinent negatives include no fever, no syncope and no visual change. Treatments tried: 1/2 ASA. The treatment provided no relief. Chest Pain (Angina)   This is a new problem. The current episode started yesterday. The problem occurs constantly. The pain is present in the substernal region. The quality of the pain is described as pressure-like. The pain does not radiate. Associated symptoms include dizziness, headaches, nausea, numbness (right face and arm), shortness of breath, vomiting and weakness. Pertinent negatives include no cough, no fever, no leg pain, no lower extremity edema and no syncope. She has tried aspirin for the symptoms. The treatment provided no relief. Shortness of Breath  This is a chronic problem. The problem occurs continuously. Associated symptoms include headaches, chest pain and vomiting. Pertinent negatives include no fever, no cough, no syncope and no leg pain. It is unknown what precipitated the problem. Associated medical issues include COPD.         Past Medical History:   Diagnosis Date    ARF (acute renal failure) ([facility] [address] 7/4/2014    Arthritis     Cancer (HCC)     cervical and labia cancer    Carpal tunnel syndrome     Chronic pain     COPD     DDD (degenerative disc disease)     Depression 7/4/2014    Fibromyalgia     Gastrointestinal disorder     ischemic bowel    Ill-defined condition     ischemic colitis 2014    Psychiatric disorder     anxiety       Past Surgical History:   Procedure Laterality Date    HX CARPAL TUNNEL RELEASE      HX GI      ileostomy July 2014    HX GYN  hysterectomy age 23    HX HEENT      tmj    HX ORTHOPAEDIC      neck surgery    HX ORTHOPAEDIC      knee surgery    HX OTHER SURGICAL           Family History:   Problem Relation Age of Onset    Heart Disease Mother     Heart Disease Father     Alcohol abuse Neg Hx     Arthritis-rheumatoid Neg Hx     Asthma Neg Hx     Bleeding Prob Neg Hx     Cancer Neg Hx     Diabetes Neg Hx     Elevated Lipids Neg Hx     Headache Neg Hx     Hypertension Neg Hx     Lung Disease Neg Hx     Migraines Neg Hx     Psychiatric Disorder Neg Hx     Stroke Neg Hx     Mental Retardation Neg Hx        Social History     Socioeconomic History    Marital status: LEGALLY      Spouse name: Not on file    Number of children: Not on file    Years of education: Not on file    Highest education level: Not on file   Occupational History    Not on file   Social Needs    Financial resource strain: Not on file    Food insecurity     Worry: Not on file     Inability: Not on file    Transportation needs     Medical: Not on file     Non-medical: Not on file   Tobacco Use    Smoking status: Current Some Day Smoker     Packs/day: 0.25    Smokeless tobacco: Never Used    Tobacco comment: 3 a day   Substance and Sexual Activity    Alcohol use: No    Drug use: No     Types: Marijuana    Sexual activity: Yes     Partners: Male   Lifestyle    Physical activity     Days per week: Not on file     Minutes per session: Not on file    Stress: Not on file   Relationships    Social connections     Talks on phone: Not on file     Gets together: Not on file     Attends Mu-ism service: Not on file     Active member of club or organization: Not on file     Attends meetings of clubs or organizations: Not on file     Relationship status: Not on file    Intimate partner violence     Fear of current or ex partner: Not on file     Emotionally abused: Not on file     Physically abused: Not on file     Forced sexual activity: Not on file   Other Topics Concern    Not on file   Social History Narrative    Not on file         ALLERGIES: Codeine, Mucinex [guaifenesin], Morphine, Oxycodone, Pcn [penicillins], Sulfa (sulfonamide antibiotics), Tramadol, Aspirin, Gabapentin, Trazodone, and Pamelor [nortriptyline]    Review of Systems   Constitutional: Negative for fever. Respiratory: Positive for shortness of breath. Negative for cough. Cardiovascular: Positive for chest pain. Negative for syncope. Gastrointestinal: Positive for anorexia, nausea and vomiting. Neurological: Positive for dizziness, weakness, numbness (right face and arm) and headaches. Vitals:    12/22/20 2127   BP: (!) 142/89   Pulse: 75   Resp: 21   Temp: 98 °F (36.7 °C)   SpO2: 97%   Weight: 55 kg (121 lb 4.8 oz)            Physical Exam  Vitals signs and nursing note reviewed. Constitutional:       Appearance: She is well-developed. HENT:      Head: Normocephalic and atraumatic. Comments: Tenderness over right temple with prominence of right temporal artery  Eyes:      General: No scleral icterus. Neck:      Musculoskeletal: Normal range of motion. Cardiovascular:      Rate and Rhythm: Normal rate. Pulmonary:      Effort: Tachypnea and accessory muscle usage present. Abdominal:      General: There is no distension. Palpations: Abdomen is soft. Tenderness: There is no abdominal tenderness. There is no rebound. Comments: Ostomy in place and draining appropriately   Musculoskeletal:      Right lower leg: No edema. Left lower leg: No edema. Skin:     General: Skin is warm and dry. Findings: No erythema or rash. Neurological:      General: No focal deficit present. Mental Status: She is alert and oriented to person, place, and time.       GCS: GCS eye subscore is 4. GCS verbal subscore is 5. GCS motor subscore is 6. Cranial Nerves: No facial asymmetry. Sensory: Sensory deficit (reports decreased sensation to right side of face and right arm, and tenderness to the right temple) present. Motor: No weakness. Psychiatric:         Mood and Affect: Mood is anxious. MDM       Procedures      EKG performed at 9:31 PM  Normal sinus rhythm, occasional PVCs, 72 bpm, no ST changes, no T wave changes, no ectopy  EKG interpreted by Bob Forbes MD     11:07 PM  Change of shift. Care of patient signed over to Dr. Serg Guerrero. Bedside handoff complete. Awaiting reassessment after treatments and repeat troponin.

## 2020-12-23 NOTE — ED NOTES
11:07 PM  Change of shift. Care of patient taken over from Dr. Richie Madison; H&P reviewed, bedside handoff complete. Awaiting repeat troponin and medications to be completed. On reevaluation patient feels better. HA has mostly resolved. CP gone, repeat Troponin is negative . Will dc patient home with return instructions and PCP follow up. Patient agrees to plan. VITAL SIGNS:  Patient Vitals for the past 4 hrs:   Pulse Resp SpO2   12/23/20 0100 65 16 99 %   12/23/20 0030 66 16 99 %   12/23/20 0000 67 13 99 %   12/22/20 2330 69 17 98 %   12/22/20 2300 68 16 99 %   12/22/20 2230 65 19 98 %   12/22/20 2200 78 16 95 %         LABS:  Recent Results (from the past 6 hour(s))   METABOLIC PANEL, COMPREHENSIVE    Collection Time: 12/22/20 10:11 PM   Result Value Ref Range    Sodium 141 136 - 145 mmol/L    Potassium 4.3 3.5 - 5.1 mmol/L    Chloride 114 (H) 97 - 108 mmol/L    CO2 24 21 - 32 mmol/L    Anion gap 3 (L) 5 - 15 mmol/L    Glucose 93 65 - 100 mg/dL    BUN 21 (H) 6 - 20 MG/DL    Creatinine 1.09 (H) 0.55 - 1.02 MG/DL    BUN/Creatinine ratio 19 12 - 20      GFR est AA >60 >60 ml/min/1.73m2    GFR est non-AA 51 (L) >60 ml/min/1.73m2    Calcium 8.4 (L) 8.5 - 10.1 MG/DL    Bilirubin, total 0.5 0.2 - 1.0 MG/DL    ALT (SGPT) 30 12 - 78 U/L    AST (SGOT) 32 15 - 37 U/L    Alk.  phosphatase 107 45 - 117 U/L    Protein, total 6.4 6.4 - 8.2 g/dL    Albumin 3.0 (L) 3.5 - 5.0 g/dL    Globulin 3.4 2.0 - 4.0 g/dL    A-G Ratio 0.9 (L) 1.1 - 2.2     TROPONIN I    Collection Time: 12/22/20 10:11 PM   Result Value Ref Range    Troponin-I, Qt. <0.05 <0.05 ng/mL   C REACTIVE PROTEIN, QT    Collection Time: 12/22/20 10:11 PM   Result Value Ref Range    C-Reactive protein <0.29 0.00 - 0.60 mg/dL   PROTHROMBIN TIME + INR    Collection Time: 12/22/20 10:22 PM   Result Value Ref Range    INR 0.9 0.9 - 1.1      Prothrombin time 9.6 9.0 - 11.1 sec   CBC WITH AUTOMATED DIFF    Collection Time: 12/22/20 10:22 PM   Result Value Ref Range    WBC 6.4 3.6 - 11.0 K/uL    RBC 4.71 3.80 - 5.20 M/uL    HGB 15.5 11.5 - 16.0 g/dL    HCT 46.5 35.0 - 47.0 %    MCV 98.7 80.0 - 99.0 FL    MCH 32.9 26.0 - 34.0 PG    MCHC 33.3 30.0 - 36.5 g/dL    RDW 13.8 11.5 - 14.5 %    PLATELET 716 094 - 901 K/uL    MPV 10.0 8.9 - 12.9 FL    NRBC 0.0 0  WBC    ABSOLUTE NRBC 0.00 0.00 - 0.01 K/uL    NEUTROPHILS 53 32 - 75 %    LYMPHOCYTES 38 12 - 49 %    MONOCYTES 7 5 - 13 %    EOSINOPHILS 1 0 - 7 %    BASOPHILS 1 0 - 1 %    IMMATURE GRANULOCYTES 0 0.0 - 0.5 %    ABS. NEUTROPHILS 3.4 1.8 - 8.0 K/UL    ABS. LYMPHOCYTES 2.4 0.8 - 3.5 K/UL    ABS. MONOCYTES 0.4 0.0 - 1.0 K/UL    ABS. EOSINOPHILS 0.1 0.0 - 0.4 K/UL    ABS. BASOPHILS 0.1 0.0 - 0.1 K/UL    ABS. IMM. GRANS. 0.0 0.00 - 0.04 K/UL    DF AUTOMATED     SED RATE (ESR)    Collection Time: 12/22/20 10:22 PM   Result Value Ref Range    Sed rate, automated 4 0 - 30 mm/hr   SAMPLES BEING HELD    Collection Time: 12/22/20 10:22 PM   Result Value Ref Range    SAMPLES BEING HELD SST. RED     COMMENT        Add-on orders for these samples will be processed based on acceptable specimen integrity and analyte stability, which may vary by analyte.    POC TROPONIN-I    Collection Time: 12/23/20 12:30 AM   Result Value Ref Range    Troponin-I (POC) <0.04 0.00 - 0.08 ng/mL        IMAGING:  CT HEAD WO CONT   Final Result   IMPRESSION:          No acute intracranial abnormality            Medications During Visit:  Medications   acetaminophen (TYLENOL) tablet 1,000 mg (1,000 mg Oral Given 12/22/20 2238)   prochlorperazine (COMPAZINE) injection 10 mg (10 mg IntraVENous Given 12/22/20 2230)   diphenhydrAMINE (BENADRYL) injection 50 mg (50 mg IntraVENous Given 12/22/20 2228)   sodium chloride 0.9 % bolus infusion 1,000 mL (0 mL IntraVENous IV Completed 12/23/20 0003)   methylPREDNISolone (PF) (Solu-MEDROL) injection 240 mg (240 mg IntraVENous Given 12/22/20 2223)   aspirin chewable tablet 324 mg (324 mg Oral Given 12/22/20 2226)   ketorolac (TORADOL) injection 30 mg (30 mg IntraVENous Given 12/22/20 4516)       IMPRESSION:  1. Nonintractable headache, unspecified chronicity pattern, unspecified headache type    2. Chest wall pain        DISPOSITION:  Discharged      Discharge Medication List as of 12/23/2020  1:11 AM           Follow-up Information     Follow up With Specialties Details Why Contact Info    Nestora Seip, MD Family Medicine Schedule an appointment as soon as possible for a visit   [address] [place] [address] Rd  [phone]              The patient is asked to follow-up with their primary care provider in the next several days. They are to call tomorrow for an appointment. The patient is asked to return promptly for any increased concerns or worsening of symptoms. They can return to this emergency department or any other emergency department.

## 2020-12-23 NOTE — ED TRIAGE NOTES
Pt. Madeline Ding in by EMS from home with complaints of headache since yesterday, chest pain/pressure. States tingling on her right side. Pt. Is complaining of SOB and weight on her chest. Took 1/2 of an aspirin earlier today with no relief. Pt. Is on 2 liters of oxygen normally. Pt. States has stopped taking her medications about a year ago.

## 2021-06-18 ENCOUNTER — APPOINTMENT (OUTPATIENT)
Dept: CT IMAGING | Age: 61
End: 2021-06-18
Attending: EMERGENCY MEDICINE
Payer: COMMERCIAL

## 2021-06-18 ENCOUNTER — HOSPITAL ENCOUNTER (EMERGENCY)
Age: 61
Discharge: HOME | End: 2021-06-18
Attending: EMERGENCY MEDICINE
Payer: COMMERCIAL

## 2021-06-18 VITALS
TEMPERATURE: 97.9 F | RESPIRATION RATE: 16 BRPM | SYSTOLIC BLOOD PRESSURE: 132 MMHG | DIASTOLIC BLOOD PRESSURE: 83 MMHG | HEART RATE: 93 BPM | OXYGEN SATURATION: 92 %

## 2021-06-18 DIAGNOSIS — T71.193A ASSAULT BY MANUAL STRANGULATION: Primary | ICD-10-CM

## 2021-06-18 LAB
CA-I BLD-SCNC: 1.27 MMOL/L (ref 1.12–1.32)
COMMENT, HOLDF: NORMAL
SAMPLES BEING HELD,HOLD: NORMAL

## 2021-06-18 PROCEDURE — 36415 COLL VENOUS BLD VENIPUNCTURE: CPT

## 2021-06-18 PROCEDURE — 99284 EMERGENCY DEPT VISIT MOD MDM: CPT

## 2021-06-18 PROCEDURE — 74011250636 HC RX REV CODE- 250/636: Performed by: EMERGENCY MEDICINE

## 2021-06-18 PROCEDURE — 70498 CT ANGIOGRAPHY NECK: CPT

## 2021-06-18 PROCEDURE — 74011000636 HC RX REV CODE- 636: Performed by: RADIOLOGY

## 2021-06-18 PROCEDURE — 82330 ASSAY OF CALCIUM: CPT

## 2021-06-18 PROCEDURE — 75810000275 HC EMERGENCY DEPT VISIT NO LEVEL OF CARE

## 2021-06-18 RX ADMIN — SODIUM CHLORIDE 1000 ML: 9 INJECTION, SOLUTION INTRAVENOUS at 03:33

## 2021-06-18 RX ADMIN — IOPAMIDOL 80 ML: 755 INJECTION, SOLUTION INTRAVENOUS at 02:17
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
## 2021-06-29 ENCOUNTER — APPOINTMENT (OUTPATIENT)
Dept: GENERAL RADIOLOGY | Age: 61
End: 2021-06-29
Attending: EMERGENCY MEDICINE
Payer: MEDICAID

## 2021-06-29 ENCOUNTER — APPOINTMENT (OUTPATIENT)
Dept: CT IMAGING | Age: 61
End: 2021-06-29
Attending: EMERGENCY MEDICINE
Payer: MEDICAID

## 2021-06-29 ENCOUNTER — HOSPITAL ENCOUNTER (EMERGENCY)
Age: 61
Discharge: HOME | End: 2021-06-29
Attending: EMERGENCY MEDICINE
Payer: MEDICAID

## 2021-06-29 VITALS
BODY MASS INDEX: 18.33 KG/M2 | RESPIRATION RATE: 23 BRPM | HEIGHT: 65 IN | WEIGHT: 110 LBS | DIASTOLIC BLOOD PRESSURE: 75 MMHG | HEART RATE: 86 BPM | SYSTOLIC BLOOD PRESSURE: 119 MMHG | OXYGEN SATURATION: 91 % | TEMPERATURE: 98.1 F

## 2021-06-29 DIAGNOSIS — J44.1 COPD EXACERBATION (HCC): Primary | ICD-10-CM

## 2021-06-29 LAB
ALBUMIN SERPL-MCNC: 3.6 G/DL (ref 3.5–5)
ALBUMIN/GLOB SERPL: 1 {RATIO} (ref 1.1–2.2)
ALP SERPL-CCNC: 79 U/L (ref 45–117)
ALT SERPL-CCNC: 40 U/L (ref 12–78)
ANION GAP SERPL CALC-SCNC: 12 MMOL/L (ref 5–15)
AST SERPL-CCNC: 16 U/L (ref 15–37)
BASOPHILS # BLD: 0 K/UL (ref 0–0.1)
BASOPHILS NFR BLD: 0 % (ref 0–1)
BILIRUB SERPL-MCNC: 0.5 MG/DL (ref 0.2–1)
BUN SERPL-MCNC: 31 MG/DL (ref 6–20)
BUN/CREAT SERPL: 23 (ref 12–20)
CALCIUM SERPL-MCNC: 8.7 MG/DL (ref 8.5–10.1)
CHLORIDE SERPL-SCNC: 107 MMOL/L (ref 97–108)
CO2 SERPL-SCNC: 25 MMOL/L (ref 21–32)
COMMENT, HOLDF: NORMAL
CREAT SERPL-MCNC: 1.33 MG/DL (ref 0.55–1.02)
D DIMER PPP FEU-MCNC: 0.82 MG/L FEU (ref 0–0.65)
DIFFERENTIAL METHOD BLD: ABNORMAL
EOSINOPHIL # BLD: 0 K/UL (ref 0–0.4)
EOSINOPHIL NFR BLD: 0 % (ref 0–7)
ERYTHROCYTE [DISTWIDTH] IN BLOOD BY AUTOMATED COUNT: 14.6 % (ref 11.5–14.5)
GLOBULIN SER CALC-MCNC: 3.7 G/DL (ref 2–4)
GLUCOSE SERPL-MCNC: 131 MG/DL (ref 65–100)
HCT VFR BLD AUTO: 44.9 % (ref 35–47)
HGB BLD-MCNC: 14.5 G/DL (ref 11.5–16)
IMM GRANULOCYTES # BLD AUTO: 0.3 K/UL (ref 0–0.04)
IMM GRANULOCYTES NFR BLD AUTO: 3 % (ref 0–0.5)
LYMPHOCYTES # BLD: 1.2 K/UL (ref 0.8–3.5)
LYMPHOCYTES NFR BLD: 12 % (ref 12–49)
MAGNESIUM SERPL-MCNC: 2.1 MG/DL (ref 1.6–2.4)
MCH RBC QN AUTO: 31.3 PG (ref 26–34)
MCHC RBC AUTO-ENTMCNC: 32.3 G/DL (ref 30–36.5)
MCV RBC AUTO: 97 FL (ref 80–99)
MONOCYTES # BLD: 0.2 K/UL (ref 0–1)
MONOCYTES NFR BLD: 2 % (ref 5–13)
NEUTS SEG # BLD: 7.9 K/UL (ref 1.8–8)
NEUTS SEG NFR BLD: 83 % (ref 32–75)
NRBC # BLD: 0 K/UL (ref 0–0.01)
NRBC BLD-RTO: 0 PER 100 WBC
PLATELET # BLD AUTO: 269 K/UL (ref 150–400)
PMV BLD AUTO: 10 FL (ref 8.9–12.9)
POTASSIUM SERPL-SCNC: 4 MMOL/L (ref 3.5–5.1)
PROT SERPL-MCNC: 7.3 G/DL (ref 6.4–8.2)
RBC # BLD AUTO: 4.63 M/UL (ref 3.8–5.2)
RBC MORPH BLD: ABNORMAL
SAMPLES BEING HELD,HOLD: NORMAL
SODIUM SERPL-SCNC: 144 MMOL/L (ref 136–145)
TROPONIN I SERPL-MCNC: <0.05 NG/ML
WBC # BLD AUTO: 9.6 K/UL (ref 3.6–11)

## 2021-06-29 PROCEDURE — 71046 X-RAY EXAM CHEST 2 VIEWS: CPT

## 2021-06-29 PROCEDURE — 71275 CT ANGIOGRAPHY CHEST: CPT

## 2021-06-29 PROCEDURE — 93005 ELECTROCARDIOGRAM TRACING: CPT

## 2021-06-29 PROCEDURE — 85025 COMPLETE CBC W/AUTO DIFF WBC: CPT

## 2021-06-29 PROCEDURE — 84484 ASSAY OF TROPONIN QUANT: CPT

## 2021-06-29 PROCEDURE — 99285 EMERGENCY DEPT VISIT HI MDM: CPT

## 2021-06-29 PROCEDURE — 74011000636 HC RX REV CODE- 636: Performed by: EMERGENCY MEDICINE

## 2021-06-29 PROCEDURE — 85379 FIBRIN DEGRADATION QUANT: CPT

## 2021-06-29 PROCEDURE — 74011250636 HC RX REV CODE- 250/636: Performed by: EMERGENCY MEDICINE

## 2021-06-29 PROCEDURE — 80053 COMPREHEN METABOLIC PANEL: CPT

## 2021-06-29 PROCEDURE — 83735 ASSAY OF MAGNESIUM: CPT

## 2021-06-29 PROCEDURE — 36415 COLL VENOUS BLD VENIPUNCTURE: CPT

## 2021-06-29 RX ADMIN — SODIUM CHLORIDE 1000 ML: 9 INJECTION, SOLUTION INTRAVENOUS at 13:12

## 2021-06-29 RX ADMIN — IOPAMIDOL 100 ML: 755 INJECTION, SOLUTION INTRAVENOUS at 14:57

## 2021-06-29 NOTE — ED PROVIDER NOTES
39-year-old female with a history of COPD, anxiety and tobacco use presents with a chief complaint of shortness of breath. Patient tells me that she was admitted to [facility] last week for a COPD exacerbation. She does wear oxygen at home. She was discharged on Friday and went back to [facility] on Saturday. Today she experience worsening shortness of breath and called EMS. She is currently smoking but states that she is attempting to quit. She had several breathing treatments by EMS. She denies chest pain, abdominal pain, GI or urinary symptoms. S she denies any history of DVT or PE and is not currently on any blood thinners.            Past Medical History:   Diagnosis Date    ARF (acute renal failure) ([facility] [address] 7/4/2014    Arthritis     Cancer (HCC)     cervical and labia cancer    Carpal tunnel syndrome     Chronic pain     COPD     DDD (degenerative disc disease)     Depression 7/4/2014    Fibromyalgia     Gastrointestinal disorder     ischemic bowel    Ill-defined condition     ischemic colitis 2014    Psychiatric disorder     anxiety       Past Surgical History:   Procedure Laterality Date    HX CARPAL TUNNEL RELEASE      HX GI      ileostomy July 2014    HX GYN  hysterectomy age 23    HX HEENT      tmj    HX ORTHOPAEDIC      neck surgery    HX ORTHOPAEDIC      knee surgery    HX OTHER SURGICAL           Family History:   Problem Relation Age of Onset    Heart Disease Mother     Heart Disease Father     Alcohol abuse Neg Hx     Arthritis-rheumatoid Neg Hx     Asthma Neg Hx     Bleeding Prob Neg Hx     Cancer Neg Hx     Diabetes Neg Hx     Elevated Lipids Neg Hx     Headache Neg Hx     Hypertension Neg Hx     Lung Disease Neg Hx     Migraines Neg Hx     Psychiatric Disorder Neg Hx     Stroke Neg Hx     Mental Retardation Neg Hx        Social History     Socioeconomic History    Marital status: LEGALLY      Spouse name: Not on file    Number of children: Not on file    Years of education: Not on file    Highest education level: Not on file   Occupational History    Not on file   Tobacco Use    Smoking status: Current Some Day Smoker     Packs/day: 0.25    Smokeless tobacco: Never Used    Tobacco comment: 3 a day   Substance and Sexual Activity    Alcohol use: No    Drug use: No     Types: Marijuana    Sexual activity: Yes     Partners: Male   Other Topics Concern    Not on file   Social History Narrative    Not on file     Social Determinants of Health     Financial Resource Strain:     Difficulty of Paying Living Expenses:    Food Insecurity:     Worried About Running Out of Food in the Last Year:     [address] in the Last Year:    Transportation Needs:     Lack of Transportation (Medical):  Lack of Transportation (Non-Medical):    Physical Activity:     Days of Exercise per Week:     Minutes of Exercise per Session:    Stress:     Feeling of Stress :    Social Connections:     Frequency of Communication with Friends and Family:     Frequency of Social Gatherings with Friends and Family:     Attends [employer] Services:     Active Member of Clubs or Organizations:     Attends Club or Organization Meetings:     Marital Status:    Intimate Partner Violence:     Fear of Current or Ex-Partner:     Emotionally Abused:     Physically Abused:     Sexually Abused: ALLERGIES: Codeine, Mucinex [guaifenesin], Morphine, Oxycodone, Pcn [penicillins], Sulfa (sulfonamide antibiotics), Tramadol, Aspirin, Gabapentin, Trazodone, and Pamelor [nortriptyline]    Review of Systems   Constitutional: Negative for fever. HENT: Negative for rhinorrhea. Respiratory: Positive for shortness of breath. Cardiovascular: Negative for chest pain. Gastrointestinal: Negative for abdominal pain. Genitourinary: Negative for dysuria. Musculoskeletal: Negative for back pain. Skin: Negative for wound. Neurological: Negative for headaches. Psychiatric/Behavioral: Negative for confusion. There were no vitals filed for this visit. Physical Exam  Vitals and nursing note reviewed. Constitutional:       General: She is not in acute distress. Appearance: Normal appearance. She is well-developed. She is not ill-appearing, toxic-appearing or diaphoretic. HENT:      Head: Normocephalic and atraumatic. Eyes:      Extraocular Movements: Extraocular movements intact. Cardiovascular:      Rate and Rhythm: Tachycardia present. Pulses: Normal pulses. Pulmonary:      Effort: Pulmonary effort is normal. Tachypnea present. No respiratory distress. Breath sounds: No decreased breath sounds, wheezing, rhonchi or rales. Abdominal:      General: Bowel sounds are normal. There is no distension. Palpations: Abdomen is soft. Tenderness: There is no abdominal tenderness. Musculoskeletal:         General: Normal range of motion. Cervical back: Normal range of motion. Skin:     General: Skin is warm and dry. Neurological:      Mental Status: She is alert and oriented to person, place, and time. Psychiatric:         Mood and Affect: Mood normal.          MDM  Number of Diagnoses or Management Options  COPD exacerbation ([facility] [address]  Diagnosis management comments: 79-year-old female with history of COPD presents with shortness of breath. She is tachypneic and tachycardic without significant wheezing or rhonchi. She was given breathing treatments by EMS. Laboratory studies were obtained along with a D-dimer as PE is certainly consideration. D-dimer returned elevated and a CTA of the chest shows no evidence of pulmonary embolism. Patient has no evidence of pneumonia although she is currently on antibiotics and steroids as prescribed by physicians from her prior admission. At this point she is stable for discharge home for follow-up with pulmonology. I did recommend smoking cessation.   Discussed my clinical impression(s), any labs and/or radiology results with the patient. I answered any questions and addressed any concerns. Discussed the importance of following up with their primary care physician and/or specialist(s). Discussed signs or symptoms that would warrant return back to the ER for further evaluation. The patient is agreeable with discharge. EKG shows sinus tachycardia rate of 101, normal intervals, normal axis, no ischemic changes, P pulmonale.        Amount and/or Complexity of Data Reviewed  Clinical lab tests: ordered and reviewed  Tests in the radiology section of CPT®: ordered and reviewed           Procedures

## 2021-06-29 NOTE — ED TRIAGE NOTES
Patient arrived via EMS with c/o SOB and cough x couple of days. Had had two nebulizer treatments  enroute with some improvement.

## 2021-06-29 NOTE — ED NOTES
The patient was discharged home by  Dr. Sun Allen in stable condition. The patient is alert and oriented,discharge vital signs obtained. The patient's diagnosis, condition and treatment were explained. The patient expressed understanding. Education given about Chronic Obstructive Pulmonary Disease. No prescriptions given. No work/school note given. A discharge plan has been developed. A  was not involved in the process. Aftercare instructions were given. Pt ambulated out to the  waiting  Room to wait on transport To arrive set up by Jeanne Benson , our  .

## 2021-06-30 LAB
ATRIAL RATE: 101 BPM
CALCULATED P AXIS, ECG09: 83 DEGREES
CALCULATED R AXIS, ECG10: 88 DEGREES
CALCULATED T AXIS, ECG11: 62 DEGREES
DIAGNOSIS, 93000: NORMAL
P-R INTERVAL, ECG05: 100 MS
Q-T INTERVAL, ECG07: 354 MS
QRS DURATION, ECG06: 82 MS
QTC CALCULATION (BEZET), ECG08: 459 MS
VENTRICULAR RATE, ECG03: 101 BPM

## 2021-09-18 PROBLEM — Z87.19 HISTORY OF ISCHEMIC COLITIS: Status: ACTIVE | Noted: 2021-09-18

## 2021-09-18 PROBLEM — Z99.81 OXYGEN DEPENDENT: Status: ACTIVE | Noted: 2021-09-18

## 2021-09-18 PROBLEM — F41.8 ANXIETY WITH DEPRESSION: Status: ACTIVE | Noted: 2021-09-18

## 2021-09-18 PROBLEM — K21.9 GASTROESOPHAGEAL REFLUX DISEASE WITHOUT ESOPHAGITIS: Status: ACTIVE | Noted: 2021-09-18

## 2021-09-18 PROBLEM — M79.7 FIBROMYALGIA: Status: ACTIVE | Noted: 2021-09-18

## 2022-01-20 NOTE — ED NOTES
Discharge instructions given to patient by MD and nurse. Pt was given counseling on medication use and verbalizes understanding. IV d/c. Pt ambulated off unit in no signs of distress. FAMILY PRACTICE PRE-OPERATIVE EVALUATION  [facility] - [facility]    NAME: Katrina Vivas  AGE: 71 y o  SEX: female  : 1957     DATE: 2022        Family Practice Pre-Operative Evaluation      Chief Complaint: Pre-operative Evaluation     Surgery: left cataract extraction and lens implant  Anticipated Date of Surgery: 2022  Referring Provider: Dr Zaki Jesus     History of Present Illness:     Planned anesthesia is local   Patient has a bleeding risk of: no recent abnormal bleeding  Current anti-platelet/anti-coagulation medications that the patient is prescribed includes: None  Assessment of Chronic Conditions:   - none     Assessment of Cardiac Risk:  · Denies unstable or severe angina or MI in the last 6 weeks or history of stent placement in the last year   · Denies decompensated heart failure (e g  New onset heart failure, NYHA functional class IV heart failure, or worsening existing heart failure)  · Denies significant arrhythmias such as high grade AV block, symptomatic ventricular arrhythmia, newly recognized ventricular tachycardia, supraventricular tachycardia with resting heart rate >100, or symptomatic bradycardia  · Denies severe heart valve disease including aortic stenosis or symptomatic mitral stenosis     Exercise Capacity/shortness of breath symptoms/chest pain symptoms:   (this is only relevant for patients NOT having lower extremity joint replacement, therefore cannot walk the distance)  · Able to walk 4 blocks without symptoms?: Yes  · Able to walk 2 flights without symptoms?: Yes    Prior Anesthesia Reactions: No     Personal history of venous thromboembolic disease?  No       Review of Systems:       Constitutional  No fever chills no fatigue no weight loss no weight gain    Mental status  No anxiety or depression and no sleep disturbances no changes in personality or emotional problems no suicidal or homicidal ideations    Eyes  No eye pain no red eyes no visual disturbances no discharge no eye itch    ENT  No earache no hearing loss nasal discharge no sore throat no hoarseness no postnasal drip     Cardio  No chest pain no palpitations no leg edema no claudication no dyspnea on exertion no nocturnal dyspnea    Respiratory  No shortness of breath or wheeze no cough no orthopnea no dyspnea on exertion no hemoptysis no sputum production    GI  No abdominal pain no nausea no vomiting no diarrhea or constipation no bloody stools no change in bowel habits no change in weight      no dysuria hematuria no pyuria no incontinence no pelvic pain    Musculoskeletal  No myalgias arthralgias no joint swelling or stiffness no limb pain or swelling    Skin  No rashes or lesions no itchiness and no wounds    Neuro  No headache dizziness lightheadedness syncope numbness paresthesias or confusion    Heme  No swollen glands no easy bruising            Current Problem List:           Allergies:      Allergies   Allergen Reactions    Dust Mite Extract Allergic Rhinitis       Current Medications:       Current Outpatient Medications:     ascorbic acid (VITAMIN C) 500 mg tablet, Take 500 mg by mouth daily, Disp: , Rfl:     Calcium Citrate-Vitamin D (CALCIUM CITRATE CHEWY BITE PO), Take by mouth, Disp: , Rfl:     Flaxseed, Linseed, (FLAXSEED OIL PO), Take by mouth, Disp: , Rfl:     loratadine (CLARITIN) 10 mg tablet, Take 1 tablet (10 mg total) by mouth daily, Disp: 30 tablet, Rfl: 0    multivitamin (THERAGRAN) TABS, Take 1 tablet by mouth daily, Disp: , Rfl:     Omega-3 Fatty Acids (FISH OIL) 500 MG CAPS, Take by mouth 3 (three) times a day, Disp: , Rfl:     ketorolac (ACULAR) 0 5 % ophthalmic solution, , Disp: , Rfl:     ofloxacin (OCUFLOX) 0 3 % ophthalmic solution, , Disp: , Rfl:     prednisoLONE acetate (PRED FORTE) 1 % ophthalmic suspension, , Disp: , Rfl:     Past Medical History:       Past Medical History:   Diagnosis Date    Blepharitis of eyelid of left eye         Past Surgical History:   Procedure Laterality Date    DENTAL SURGERY      Baxley teeth extraction    HIP ARTHROPLASTY      Right    HIP ARTHROSCOPY      Left    TUBAL LIGATION          Family History   Problem Relation Age of Onset    Kidney cancer Mother     Lung cancer Father     No Known Problems Sister     Mental illness Cousin     No Known Problems Maternal Grandmother     No Known Problems Maternal Grandfather     No Known Problems Paternal Grandmother     No Known Problems Paternal Grandfather     Breast cancer Maternal Aunt         66's    No Known Problems Maternal Aunt     No Known Problems Maternal Aunt     No Known Problems Maternal Aunt     No Known Problems Maternal Aunt     No Known Problems Maternal Aunt     No Known Problems Paternal Aunt     Substance Abuse Neg Hx     Alcohol abuse Neg Hx         Social History     Socioeconomic History    Marital status: Single     Spouse name: Not on file    Number of children: Not on file    Years of education: Not on file    Highest education level: Not on file   Occupational History    Not on file   Tobacco Use    Smoking status: Never Smoker    Smokeless tobacco: Never Used   Vaping Use    Vaping Use: Never used   Substance and Sexual Activity    Alcohol use: No    Drug use: No    Sexual activity: Not on file   Other Topics Concern    Not on file   Social History Narrative    Always uses a seatbelt    Caffeine use- 1 cup of coffee per day    Has smoke detectors     Social Determinants of Health     Financial Resource Strain: Not on file   Food Insecurity: Not on file   Transportation Needs: Not on file   Physical Activity: Not on file   Stress: Not on file   Social Connections: Not on file   Intimate Partner Violence: Not on file   Housing Stability: Not on file Physical Exam:     /70   Pulse 79   Temp 98 °F (36 7 °C)   Resp 16   Ht 5' 1" (1 549 m)   Wt 88 5 kg (195 lb 3 2 oz)   BMI 36 88 kg/m²     Constitutional  Appears healthy, Looks well, Appearance consistent with age    Mental Status  Alert, Oriented, Cooperative, Memory function normal , clean, and reasonable    Neck  No neck mass, No thyromegaly, Good carotid upstrokes bilaterally, trachea midline positive click    Respiratory  Breath sounds normal, No rales, No rhonchi, No wheezing, normal palpation    Cardiac   Regular rhythm without ectopy or murmur no S3-S4, no heave lift or thrill to palpation    Vascular  No leg edema, No pedal edema    Muscular skeletal  No clubbing cyanosis , muscle tone normal    Skin  No appreciable rashes or abnormal appearing lesions        Data:     Pre-operative work-up    Laboratory Results: none     EKG: none          Assessment & Recommendations:     1  Preop examination             Pre-Op Evaluation Plan  1  Further preoperative workup as follows:   - None; no further preoperative work-up is required    2  Medication Management/Recommendations:   - None, continue medication regimen including morning of surgery, with sip of water    3  Patient requires further consultation with: None    Clearance  Patient is CLEARED for surgery without any additional cardiac testing       Emeka Chavez, [address] [facility]  [address] [facility] [address] [facility] [address]  Phone#  [phone]  Fax#  [phone]

## 2022-03-19 PROBLEM — Z99.81 OXYGEN DEPENDENT: Status: ACTIVE | Noted: 2021-09-18

## 2022-03-19 PROBLEM — K21.9 GASTROESOPHAGEAL REFLUX DISEASE WITHOUT ESOPHAGITIS: Status: ACTIVE | Noted: 2021-09-18

## 2022-03-19 PROBLEM — F41.8 ANXIETY WITH DEPRESSION: Status: ACTIVE | Noted: 2021-09-18

## 2022-03-19 PROBLEM — M79.7 FIBROMYALGIA: Status: ACTIVE | Noted: 2021-09-18

## 2022-03-20 PROBLEM — Z87.19 HISTORY OF ISCHEMIC COLITIS: Status: ACTIVE | Noted: 2021-09-18

## 2022-09-02 ENCOUNTER — HOSPITAL ENCOUNTER (EMERGENCY)
Age: 62
Discharge: HOME | End: 2022-09-02
Attending: EMERGENCY MEDICINE
Payer: MEDICAID

## 2022-09-02 VITALS
DIASTOLIC BLOOD PRESSURE: 96 MMHG | WEIGHT: 106 LBS | OXYGEN SATURATION: 94 % | TEMPERATURE: 97.8 F | HEART RATE: 93 BPM | RESPIRATION RATE: 18 BRPM | SYSTOLIC BLOOD PRESSURE: 115 MMHG | HEIGHT: 65 IN | BODY MASS INDEX: 17.66 KG/M2

## 2022-09-02 DIAGNOSIS — F41.1 ANXIETY STATE: Primary | ICD-10-CM

## 2022-09-02 PROCEDURE — 93005 ELECTROCARDIOGRAM TRACING: CPT

## 2022-09-02 PROCEDURE — 74011250637 HC RX REV CODE- 250/637: Performed by: EMERGENCY MEDICINE

## 2022-09-02 PROCEDURE — 99283 EMERGENCY DEPT VISIT LOW MDM: CPT

## 2022-09-02 RX ORDER — RISPERIDONE 1 MG/1
0.5 TABLET, FILM COATED ORAL ONCE
Status: COMPLETED | OUTPATIENT
Start: 2022-09-02 | End: 2022-09-02

## 2022-09-02 RX ORDER — LORAZEPAM 1 MG/1
1 TABLET ORAL
Status: COMPLETED | OUTPATIENT
Start: 2022-09-02 | End: 2022-09-02

## 2022-09-02 RX ORDER — RISPERIDONE 0.5 MG/1
0.5 TABLET, FILM COATED ORAL
Qty: 15 TABLET | Refills: 0 | Status: SHIPPED | OUTPATIENT
Start: 2022-09-02 | End: 2022-09-17

## 2022-09-02 RX ADMIN — RISPERIDONE 0.5 MG: 1 TABLET ORAL at 22:53

## 2022-09-02 RX ADMIN — LORAZEPAM 1 MG: 1 TABLET ORAL at 22:53

## 2022-09-03 LAB
ATRIAL RATE: 92 BPM
CALCULATED P AXIS, ECG09: 32 DEGREES
CALCULATED R AXIS, ECG10: 102 DEGREES
CALCULATED T AXIS, ECG11: 50 DEGREES
DIAGNOSIS, 93000: NORMAL
P-R INTERVAL, ECG05: 116 MS
Q-T INTERVAL, ECG07: 362 MS
QRS DURATION, ECG06: 80 MS
QTC CALCULATION (BEZET), ECG08: 447 MS
VENTRICULAR RATE, ECG03: 92 BPM

## 2022-09-03 NOTE — ED PROVIDER NOTES
EMERGENCY DEPARTMENT HISTORY AND PHYSICAL EXAM      Date: 9/2/2022  Patient Name: Lindi Baumgarten    Please note that this dictation was completed with [employer], the computer voice recognition software. Quite often unanticipated grammatical, syntax, homophones, and other interpretive errors are inadvertently transcribed by the computer software. Please disregard these errors. Please excuse any errors that have escaped final proofreading. History of Presenting Illness     Chief Complaint   Patient presents with    Anxiety    Chest Pain       History Provided By: Patient     HPI: Lindi Baumgarten, 60 y.o. female, with a past medical history significant for polysubstance abuse, anxiety. Patient states she has been having anxiety and panic attacks for several days. Does get some intermittent chest discomfort with her panic attacks. No chest pain at this time. Patient denies any shortness of breath. Symptoms similar to prior panic attacks. Patient is out of her Risperdal.  She is requesting some Ativan and Risperdal.    PCP: Amy Olvera MD    No current facility-administered medications on file prior to encounter. Current Outpatient Medications on File Prior to Encounter   Medication Sig Dispense Refill    acetaminophen (TYLENOL) 500 mg tablet Take 1,000 mg by mouth every six (6) hours as needed for Pain.      lithium carbonate 300 mg tablet Take 1 Tab by mouth three (3) times daily. (Patient not taking: Reported on 6/29/2021) 21 Tab 0    HYDROcodone-acetaminophen (NORCO) 5-325 mg per tablet Take 1 Tab by mouth every four (4) hours as needed for Pain. Max Daily Amount: 6 Tabs. (Patient not taking: Reported on 6/29/2021) 10 Tab 0    ondansetron (ZOFRAN ODT) 8 mg disintegrating tablet Take 1 Tab by mouth every eight (8) hours as needed for Nausea. (Patient not taking: Reported on 6/29/2021) 15 Tab 0    nystatin (MYCOSTATIN) 100,000 unit/mL suspension Take 5 mL by mouth four (4) times daily.  swish and spit (Patient not taking: Reported on 6/29/2021) 473 mL 0    clonazePAM (KLONOPIN) 0.5 mg tablet Take 0.5 mg by mouth nightly as needed for Other. Indications: PANIC DISORDER      clopidogrel (PLAVIX) 75 mg tablet Take 1 Tab by mouth daily for 30 days. 30 Tab 12    esomeprazole (NEXIUM) 20 mg capsule Take  by mouth daily. diazepam (VALIUM) 5 mg tablet Take 1 Tab by mouth every eight (8) hours as needed (spasm). Max Daily Amount: 15 mg. 15 Tab 0    tiotropium (SPIRIVA) 18 mcg inhalation capsule Take 1 Cap by inhalation daily. 30 Cap 0    ranitidine (ZANTAC) 150 mg tablet Take 150 mg by mouth two (2) times a day. albuterol (PROVENTIL VENTOLIN) 2.5 mg /3 mL (0.083 %) nebulizer solution 3 mL by Nebulization route every four (4) hours as needed for Wheezing or Shortness of Breath. 1 Package 2    fluticasone-salmeterol (ADVAIR) 250-50 mcg/dose diskus inhaler Take 1 Puff by inhalation two (2) times a day.  1 Inhaler 0       Past History     Past Medical History:  Past Medical History:   Diagnosis Date    ARF (acute renal failure) ([facility] [address] 7/4/2014    Arthritis     Cancer (HCC)     cervical and labia cancer    Carpal tunnel syndrome     Chronic pain     COPD     DDD (degenerative disc disease)     Depression 7/4/2014    Fibromyalgia     Gastrointestinal disorder     ischemic bowel    Ill-defined condition     ischemic colitis 2014    Psychiatric disorder     anxiety       Past Surgical History:  Past Surgical History:   Procedure Laterality Date    HX CARPAL TUNNEL RELEASE      HX GI      ileostomy July 2014    HX GYN  hysterectomy age 23    HX HEENT      tmj    HX ORTHOPAEDIC      neck surgery    HX ORTHOPAEDIC      knee surgery    HX OTHER SURGICAL         Family History:  Family History   Problem Relation Age of Onset    Heart Disease Mother     Heart Disease Father     Alcohol abuse Neg Hx     Arthritis-rheumatoid Neg Hx     Asthma Neg Hx     Bleeding Prob Neg Hx     Cancer Neg Hx     Diabetes Neg Hx     Elevated Lipids Neg Hx     Headache Neg Hx     Hypertension Neg Hx     Lung Disease Neg Hx     Migraines Neg Hx     Psychiatric Disorder Neg Hx     Stroke Neg Hx     Mental Retardation Neg Hx        Social History:  Social History     Tobacco Use    Smoking status: Some Days     Packs/day: 0.25     Types: Cigarettes    Smokeless tobacco: Never    Tobacco comments:     3 a day   Substance Use Topics    Alcohol use: No    Drug use: No     Types: Marijuana       Allergies: Allergies   Allergen Reactions    Codeine Anaphylaxis and Rash    Mucinex [Guaifenesin] Anaphylaxis    Morphine Nausea and Vomiting     Patient reports this drug \"makes her heart go crazy. \"     Oxycodone Itching     Mouth, lips, skin itching. Patient can take Percocet    Pcn [Penicillins] Palpitations    Sulfa (Sulfonamide Antibiotics) Nausea Only    Tramadol Nausea and Vomiting    Aspirin Other (comments)     Pt has ulcers. PT REPORTS TAKES REGULARLY    Gabapentin Nausea and Vomiting    Trazodone Other (comments)     Restless leg syndrome    Pamelor [Nortriptyline] Other (comments)     Restless legs           Review of Systems   Review of Systems   Constitutional:  Negative for chills and fever. HENT:  Negative for congestion and sore throat. Eyes:  Negative for visual disturbance. Respiratory:  Negative for cough and shortness of breath. Cardiovascular:  Positive for chest pain. Negative for leg swelling. Gastrointestinal:  Negative for abdominal pain, blood in stool, diarrhea and nausea. Endocrine: Negative for polyuria. Genitourinary:  Negative for dysuria, flank pain, vaginal bleeding and vaginal discharge. Musculoskeletal:  Negative for myalgias. Skin:  Negative for rash. Allergic/Immunologic: Negative for immunocompromised state. Neurological:  Negative for weakness and headaches. Psychiatric/Behavioral:  Negative for confusion. The patient is nervous/anxious.       Physical Exam   Physical Exam  Vitals and nursing note reviewed. Constitutional:       Appearance: She is well-developed. HENT:      Head: Normocephalic and atraumatic. Eyes:      General:         Right eye: No discharge. Left eye: No discharge. Conjunctiva/sclera: Conjunctivae normal.      Pupils: Pupils are equal, round, and reactive to light. Neck:      Trachea: No tracheal deviation. Cardiovascular:      Rate and Rhythm: Normal rate and regular rhythm. Heart sounds: Normal heart sounds. No murmur heard. Pulmonary:      Effort: Pulmonary effort is normal. No respiratory distress. Breath sounds: Normal breath sounds. No wheezing or rales. Abdominal:      General: Bowel sounds are normal.      Palpations: Abdomen is soft. Tenderness: There is no abdominal tenderness. There is no guarding or rebound. Musculoskeletal:         General: No tenderness or deformity. Normal range of motion. Cervical back: Normal range of motion and neck supple. Skin:     General: Skin is warm and dry. Findings: No erythema or rash. Neurological:      Mental Status: She is alert and oriented to person, place, and time. Psychiatric:         Behavior: Behavior normal.      Comments: Patient appears nervous, anxious       Diagnostic Study Results     Labs -   No results found for this or any previous visit (from the past 12 hour(s)). Radiologic Studies -   No orders to display     CT Results  (Last 48 hours)      None          CXR Results  (Last 48 hours)      None              Medical Decision Making   I am the first provider for this patient. I reviewed the vital signs, available nursing notes, past medical history, past surgical history, family history and social history. Vital Signs-Reviewed the patient's vital signs. Patient Vitals for the past 12 hrs:   Temp Pulse Resp BP SpO2   09/02/22 2031 97.8 °F (36.6 °C) 93 18 (!) 115/96 94 %       EKG interpretation: (Preliminary)  EKG shows sinus rhythm, rate 92. Rightward axis. No evidence of ST elevation myocardial infarction. No acute ischemic ST or T wave changes. Interpreted by me    Records Reviewed:   Nursing notes, Prior visits     Provider Notes (Medical Decision Making):   Patient's presentation most consistent with anxiety. We will give a dose of Ativan. We will refill her Risperdal.  No labs or imaging needed at this time, EKG nonconcerning, lungs are clear, doubt ACS, doubt PE, doubt pneumothorax. ED Course:   Initial assessment performed. The patients presenting problems have been discussed, and they are in agreement with the care plan formulated and outlined with them. I have encouraged them to ask questions as they arise throughout their visit. Critical Care Time:   None      Disposition:    DISCHARGE NOTE  Patients results have been reviewed with them. Patient and/or family have verbally conveyed their understanding and agreement of the patient's signs, symptoms, diagnosis, treatment and prognosis and additionally agree to follow up as recommended or return to the Emergency Room should their condition change or have any new concerns prior to their follow-up appointment. Patient verbally agrees with the care-plan and verbally conveys that all of their questions have been answered. Discharge instructions have also been provided to the patient with some educational information regarding their diagnosis as well a list of reasons why they would want to return to the ER prior to their follow-up appointment should their condition change. PLAN:  1. Discharge Medication List as of 9/2/2022 10:03 PM        START taking these medications    Details   risperiDONE (RisperDAL) 0.5 mg tablet Take 1 Tablet by mouth nightly as needed for PRN Reason (Other) (Anxiety) for up to 15 days. , Normal, Disp-15 Tablet, R-0           CONTINUE these medications which have NOT CHANGED    Details   acetaminophen (TYLENOL) 500 mg tablet Take 1,000 mg by mouth every six (6) hours as needed for Pain., Historical Med      lithium carbonate 300 mg tablet Take 1 Tab by mouth three (3) times daily. , Print, Disp-21 Tab, R-0      HYDROcodone-acetaminophen (NORCO) 5-325 mg per tablet Take 1 Tab by mouth every four (4) hours as needed for Pain. Max Daily Amount: 6 Tabs., Print, Disp-10 Tab, R-0      ondansetron (ZOFRAN ODT) 8 mg disintegrating tablet Take 1 Tab by mouth every eight (8) hours as needed for Nausea. , Print, Disp-15 Tab, R-0      nystatin (MYCOSTATIN) 100,000 unit/mL suspension Take 5 mL by mouth four (4) times daily. swish and spit, Print, Disp-473 mL, R-0      clonazePAM (KLONOPIN) 0.5 mg tablet Take 0.5 mg by mouth nightly as needed for Other. Indications: PANIC DISORDER, Historical Med      clopidogrel (PLAVIX) 75 mg tablet Take 1 Tab by mouth daily for 30 days. , Normal, Disp-30 Tab, R-12      esomeprazole (NEXIUM) 20 mg capsule Take  by mouth daily. , Historical Med      diazepam (VALIUM) 5 mg tablet Take 1 Tab by mouth every eight (8) hours as needed (spasm). Max Daily Amount: 15 mg., Print, Disp-15 Tab, R-0      tiotropium (SPIRIVA) 18 mcg inhalation capsule Take 1 Cap by inhalation daily. , Normal, Disp-30 Cap, R-0      ranitidine (ZANTAC) 150 mg tablet Take 150 mg by mouth two (2) times a day., Historical Med      albuterol (PROVENTIL VENTOLIN) 2.5 mg /3 mL (0.083 %) nebulizer solution 3 mL by Nebulization route every four (4) hours as needed for Wheezing or Shortness of Breath., Normal, Disp-1 Package, R-2      fluticasone-salmeterol (ADVAIR) 250-50 mcg/dose diskus inhaler Take 1 Puff by inhalation two (2) times a day., Normal, Disp-1 Inhaler, R-0           2.    Follow-up Information       Follow up With Specialties Details Why Contact Info    Ceci Waggoner MD Family Medicine Schedule an appointment as soon as possible for a visit   [address] [address] [phone]      [facility] EMERGENCY DEPT Emergency Medicine  If symptoms worsen [address] [facility] [address]  [phone]            Return to ED if worse     Diagnosis     Clinical Impression:   1. Anxiety state        Attestations:   This note was completed by Jacinto Love DO

## 2022-09-03 NOTE — ED NOTES
Patient has been instructed that they have been given medication which contains opioids, benzodiazepines, or other sedating drugs. Patient is aware that they  will need to refrain from driving or operating heavy machinery after taking this medication. Patient also instructed that they need to avoid drinking alcohol and using other products containing opioids, benzodiazepines, or other sedating drugs. Patient verbalized understanding.

## 2022-09-03 NOTE — ED NOTES
Emergency Department Nursing Plan of Care       The Nursing Plan of Care is developed from the Nursing assessment and Emergency Department Attending provider initial evaluation. The plan of care may be reviewed in the ED Provider note.     The Plan of Care was developed with the following considerations:   Patient / Family readiness to learn indicated by:verbalized understanding  Persons(s) to be included in education: patient  Barriers to Learning/Limitations:No    Signed     Wilder Starr RN    9/2/2022   10:12 PM

## 2022-09-03 NOTE — ED TRIAGE NOTES
Patient to ED for anxiety, patient reports has been out of her risperidone x3 weeks. Currently living in a sober living home, is unable to get in with her psychiatrist until 9/14. Requesting ativan shot. Patient also reporting mid chest pain and L arm pain that started today. Reports hx of A-fib.

## 2022-09-06 ENCOUNTER — HOSPITAL ENCOUNTER (EMERGENCY)
Age: 62
Discharge: HOME | End: 2022-09-06
Attending: EMERGENCY MEDICINE
Payer: MEDICAID

## 2022-09-06 VITALS
RESPIRATION RATE: 16 BRPM | DIASTOLIC BLOOD PRESSURE: 80 MMHG | HEIGHT: 65 IN | SYSTOLIC BLOOD PRESSURE: 125 MMHG | BODY MASS INDEX: 17.66 KG/M2 | OXYGEN SATURATION: 96 % | WEIGHT: 106 LBS | HEART RATE: 98 BPM | TEMPERATURE: 98 F

## 2022-09-06 DIAGNOSIS — H11.32 SUBCONJUNCTIVAL HEMORRHAGE, LEFT: ICD-10-CM

## 2022-09-06 DIAGNOSIS — S05.02XA ABRASION OF LEFT CORNEA, INITIAL ENCOUNTER: Primary | ICD-10-CM

## 2022-09-06 DIAGNOSIS — S05.92XA LEFT EYE INJURY, INITIAL ENCOUNTER: ICD-10-CM

## 2022-09-06 PROCEDURE — 74011000250 HC RX REV CODE- 250: Performed by: NURSE PRACTITIONER

## 2022-09-06 PROCEDURE — 99283 EMERGENCY DEPT VISIT LOW MDM: CPT

## 2022-09-06 RX ORDER — TETRACAINE HYDROCHLORIDE 5 MG/ML
1 SOLUTION OPHTHALMIC
Status: COMPLETED | OUTPATIENT
Start: 2022-09-06 | End: 2022-09-06

## 2022-09-06 RX ORDER — ERYTHROMYCIN 5 MG/G
OINTMENT OPHTHALMIC
Qty: 1 G | Refills: 0 | Status: SHIPPED | OUTPATIENT
Start: 2022-09-06

## 2022-09-06 RX ADMIN — FLUORESCEIN SODIUM 1 STRIP: 0.6 STRIP OPHTHALMIC at 17:33

## 2022-09-06 RX ADMIN — TETRACAINE HYDROCHLORIDE 1 DROP: 5 SOLUTION OPHTHALMIC at 17:33

## 2022-09-06 NOTE — ED TRIAGE NOTES
Reports getting struck in left eye by thumb tack. Hemorrhage and soreness noted. Reports some blurred vision.

## 2022-09-06 NOTE — DISCHARGE INSTRUCTIONS
It was a pleasure taking care of you at [facility] Emergency Department today. We know that when you come to [facility], you are entrusting us with your health, comfort, and safety. Our physicians and nurses honor that trust, and we truly appreciate the opportunity to care for you and your loved ones. We also value our feedback. If you receive a survey about your Emergency Department experience today, please fill it out. We care about our patients' feedback, and we listen to what you have to say. Thank you!

## 2022-09-06 NOTE — ED NOTES
Patient states thumb tact entered left eye when hanging a curtain. Patient endorses blurred vision and slight headache. Emergency Department Nursing Plan of Care       The Nursing Plan of Care is developed from the Nursing assessment and Emergency Department Attending provider initial evaluation. The plan of care may be reviewed in the ED Provider note.     The Plan of Care was developed with the following considerations:   Patient / Family readiness to learn indicated by:verbalized understanding  Persons(s) to be included in education: patient  Barriers to Learning/Limitations:No    Signed     Silvia Varghese RN    9/6/2022   5:30 PM

## 2022-09-06 NOTE — ED PROVIDER NOTES
EMERGENCY DEPARTMENT HISTORY AND PHYSICAL EXAM          Date: 9/6/2022  Patient Name: Ortiz Farrell    History of Presenting Illness     Chief Complaint   Patient presents with    Eye Injury         History Provided By: Patient    HPI: Ortiz Farrell is a 60 y.o. female with a PMH of  anxiety, arthritis, COPD, depression  who presents with eye injury. Onset 1 hour prior to arrival.  Located to the left. Patient states she was pulling down a curtain when tach fell off and hit her in the eye. Associated with severe pain that she describes as burning. Reports blurred vision but no vision loss. Denies drainage, bleeding, swelling, itching, headache. Patient states she wears glasses at baseline. Denies eye contact usage. She has not tried anything for her symptoms. PCP: Brian Dwyer MD    Current Outpatient Medications   Medication Sig Dispense Refill    erythromycin (ILOTYCIN) ophthalmic ointment Apply 1/2 inch ribbon to left eye every 6 hours for 7 days  Indications: pink eye from bacterial infection 1 g 0    risperiDONE (RisperDAL) 0.5 mg tablet Take 1 Tablet by mouth nightly as needed for PRN Reason (Other) (Anxiety) for up to 15 days. 15 Tablet 0    acetaminophen (TYLENOL) 500 mg tablet Take 1,000 mg by mouth every six (6) hours as needed for Pain.      lithium carbonate 300 mg tablet Take 1 Tab by mouth three (3) times daily. (Patient not taking: Reported on 6/29/2021) 21 Tab 0    HYDROcodone-acetaminophen (NORCO) 5-325 mg per tablet Take 1 Tab by mouth every four (4) hours as needed for Pain. Max Daily Amount: 6 Tabs. (Patient not taking: Reported on 6/29/2021) 10 Tab 0    ondansetron (ZOFRAN ODT) 8 mg disintegrating tablet Take 1 Tab by mouth every eight (8) hours as needed for Nausea. (Patient not taking: Reported on 6/29/2021) 15 Tab 0    nystatin (MYCOSTATIN) 100,000 unit/mL suspension Take 5 mL by mouth four (4) times daily.  swish and spit (Patient not taking: Reported on 6/29/2021) 473 mL 0 clonazePAM (KLONOPIN) 0.5 mg tablet Take 0.5 mg by mouth nightly as needed for Other. Indications: PANIC DISORDER      clopidogrel (PLAVIX) 75 mg tablet Take 1 Tab by mouth daily for 30 days. 30 Tab 12    esomeprazole (NEXIUM) 20 mg capsule Take  by mouth daily. diazepam (VALIUM) 5 mg tablet Take 1 Tab by mouth every eight (8) hours as needed (spasm). Max Daily Amount: 15 mg. 15 Tab 0    tiotropium (SPIRIVA) 18 mcg inhalation capsule Take 1 Cap by inhalation daily. 30 Cap 0    ranitidine (ZANTAC) 150 mg tablet Take 150 mg by mouth two (2) times a day. albuterol (PROVENTIL VENTOLIN) 2.5 mg /3 mL (0.083 %) nebulizer solution 3 mL by Nebulization route every four (4) hours as needed for Wheezing or Shortness of Breath. 1 Package 2    fluticasone-salmeterol (ADVAIR) 250-50 mcg/dose diskus inhaler Take 1 Puff by inhalation two (2) times a day.  1 Inhaler 0       Past History     Past Medical History:  Past Medical History:   Diagnosis Date    ARF (acute renal failure) ([facility] [address] 7/4/2014    Arthritis     Cancer (HCC)     cervical and labia cancer    Carpal tunnel syndrome     Chronic pain     COPD     DDD (degenerative disc disease)     Depression 7/4/2014    Fibromyalgia     Gastrointestinal disorder     ischemic bowel    Ill-defined condition     ischemic colitis 2014    Psychiatric disorder     anxiety       Past Surgical History:  Past Surgical History:   Procedure Laterality Date    HX CARPAL TUNNEL RELEASE      HX GI      ileostomy July 2014    HX GYN  hysterectomy age 23    HX HEENT      tmj    HX ORTHOPAEDIC      neck surgery    HX ORTHOPAEDIC      knee surgery    HX OTHER SURGICAL         Family History:  Family History   Problem Relation Age of Onset    Heart Disease Mother     Heart Disease Father     Alcohol abuse Neg Hx     Arthritis-rheumatoid Neg Hx     Asthma Neg Hx     Bleeding Prob Neg Hx     Cancer Neg Hx     Diabetes Neg Hx     Elevated Lipids Neg Hx     Headache Neg Hx     Hypertension Neg Hx Lung Disease Neg Hx     Migraines Neg Hx     Psychiatric Disorder Neg Hx     Stroke Neg Hx     Mental Retardation Neg Hx        Social History:  Social History     Tobacco Use    Smoking status: Some Days     Packs/day: 0.25     Types: Cigarettes    Smokeless tobacco: Never    Tobacco comments:     3 a day   Substance Use Topics    Alcohol use: No    Drug use: No     Types: Marijuana       Allergies: Allergies   Allergen Reactions    Codeine Anaphylaxis and Rash    Mucinex [Guaifenesin] Anaphylaxis    Morphine Nausea and Vomiting     Patient reports this drug \"makes her heart go crazy. \"     Oxycodone Itching     Mouth, lips, skin itching. Patient can take Percocet    Pcn [Penicillins] Palpitations    Sulfa (Sulfonamide Antibiotics) Nausea Only    Tramadol Nausea and Vomiting    Aspirin Other (comments)     Pt has ulcers. PT REPORTS TAKES REGULARLY    Gabapentin Nausea and Vomiting    Trazodone Other (comments)     Restless leg syndrome    Pamelor [Nortriptyline] Other (comments)     Restless legs           Review of Systems   Review of Systems   Constitutional:  Negative for chills and fever. HENT:  Negative for congestion, ear pain and rhinorrhea. Eyes:  Positive for photophobia, pain, redness and visual disturbance. Negative for discharge and itching. Respiratory:  Negative for cough and shortness of breath. Cardiovascular:  Negative for chest pain. Gastrointestinal:  Negative for abdominal pain, nausea and vomiting. Musculoskeletal:  Negative for arthralgias and joint swelling. Skin:  Negative for color change and rash. Neurological:  Negative for headaches. All other systems reviewed and are negative. Physical Exam     Vitals:    09/06/22 1658   BP: 125/80   Pulse: 98   Resp: 16   Temp: 98 °F (36.7 °C)   SpO2: 96%   Weight: 48.1 kg (106 lb)   Height: 5' 5\" (1.651 m)     Physical Exam  Vitals and nursing note reviewed. Constitutional:       General: She is not in acute distress. Appearance: She is well-developed. She is not ill-appearing. HENT:      Head: Normocephalic and atraumatic. Nose: Nose normal.      Mouth/Throat:      Mouth: Mucous membranes are moist.      Pharynx: Oropharynx is clear. No oropharyngeal exudate. Eyes:      General: Lids are normal. Vision grossly intact. Gaze aligned appropriately. Right eye: No discharge or hordeolum. Left eye: No foreign body, discharge or hordeolum. Extraocular Movements: Extraocular movements intact. Conjunctiva/sclera:      Right eye: Right conjunctiva is not injected. No chemosis, exudate or hemorrhage. Left eye: Left conjunctiva is not injected. Hemorrhage present. No chemosis or exudate. Pupils: Pupils are equal, round, and reactive to light. Comments: Negative emilee sign   Cardiovascular:      Rate and Rhythm: Normal rate and regular rhythm. Pulses: Normal pulses. Heart sounds: Normal heart sounds. Pulmonary:      Effort: Pulmonary effort is normal. No respiratory distress. Breath sounds: Normal breath sounds. No wheezing. Musculoskeletal:      Cervical back: Normal range of motion and neck supple. Lymphadenopathy:      Cervical: No cervical adenopathy. Neurological:      Mental Status: She is alert and oriented to person, place, and time. Diagnostic Study Results     Labs -   No results found for this or any previous visit (from the past 12 hour(s)). Radiologic Studies -   No orders to display     CT Results  (Last 48 hours)      None          CXR Results  (Last 48 hours)      None              Medical Decision Making   I am the first provider for this patient. I reviewed the vital signs, available nursing notes, past medical history, past surgical history, family history and social history. Vital Signs-Reviewed the patient's vital signs.     Records Reviewed: Nursing Notes and Old Medical Records    Provider Notes (Medical Decision Making):     59 yo F presenting with eye injury exhibiting subconjunctival hemorrhage to the L eye near the inner canthus. No drainage noted on exam. Eye stain shows corneal abrasion with negative emilee sign. She has good EOMI and PERRLA. No hyphema or fluid noted in anterior chamber  Low suspicion for globe rupture. ED Course as of 09/06/22 2201   Tue Sep 06, 2022   [id number] Progress Note:   2nd evaluation completed by attending Dr Mindy Siegel and he agrees with corneal abrasion noted on exam. Plan to treat with erythromycin and advised follow up with ophthalmology within 24 hours [NA]      ED Course User Index  [NA] Ac Oliveira NP          Disposition:  Discharge     DISCHARGE NOTE:       Care plan outlined and precautions discussed. Patient has no new complaints, changes, or physical findings. All of pt's questions and concerns were addressed. Patient was instructed and agrees to follow up with Opthalmology, as well as to return to the ED upon further deterioration. Patient is ready to go home. Follow-up Information       Follow up With Specialties Details Why [address] Opthalmology  Schedule an appointment as soon as possible for a visit  evaluation of eye injury [address] [address]  [phone]            Discharge Medication List as of 9/6/2022  6:35 PM        START taking these medications    Details   erythromycin (ILOTYCIN) ophthalmic ointment Apply 1/2 inch ribbon to left eye every 6 hours for 7 days  Indications: pink eye from bacterial infection, Normal, Disp-1 g, R-0           CONTINUE these medications which have NOT CHANGED    Details   risperiDONE (RisperDAL) 0.5 mg tablet Take 1 Tablet by mouth nightly as needed for PRN Reason (Other) (Anxiety) for up to 15 days. , Normal, Disp-15 Tablet, R-0      acetaminophen (TYLENOL) 500 mg tablet Take 1,000 mg by mouth every six (6) hours as needed for Pain., Historical Med      lithium carbonate 300 mg tablet Take 1 Tab by mouth three (3) times daily. , Print, Disp-21 Tab, R-0      HYDROcodone-acetaminophen (NORCO) 5-325 mg per tablet Take 1 Tab by mouth every four (4) hours as needed for Pain. Max Daily Amount: 6 Tabs., Print, Disp-10 Tab, R-0      ondansetron (ZOFRAN ODT) 8 mg disintegrating tablet Take 1 Tab by mouth every eight (8) hours as needed for Nausea. , Print, Disp-15 Tab, R-0      nystatin (MYCOSTATIN) 100,000 unit/mL suspension Take 5 mL by mouth four (4) times daily. swish and spit, Print, Disp-473 mL, R-0      clonazePAM (KLONOPIN) 0.5 mg tablet Take 0.5 mg by mouth nightly as needed for Other. Indications: PANIC DISORDER, Historical Med      clopidogrel (PLAVIX) 75 mg tablet Take 1 Tab by mouth daily for 30 days. , Normal, Disp-30 Tab, R-12      esomeprazole (NEXIUM) 20 mg capsule Take  by mouth daily. , Historical Med      diazepam (VALIUM) 5 mg tablet Take 1 Tab by mouth every eight (8) hours as needed (spasm). Max Daily Amount: 15 mg., Print, Disp-15 Tab, R-0      tiotropium (SPIRIVA) 18 mcg inhalation capsule Take 1 Cap by inhalation daily. , Normal, Disp-30 Cap, R-0      ranitidine (ZANTAC) 150 mg tablet Take 150 mg by mouth two (2) times a day., Historical Med      albuterol (PROVENTIL VENTOLIN) 2.5 mg /3 mL (0.083 %) nebulizer solution 3 mL by Nebulization route every four (4) hours as needed for Wheezing or Shortness of Breath., Normal, Disp-1 Package, R-2      fluticasone-salmeterol (ADVAIR) 250-50 mcg/dose diskus inhaler Take 1 Puff by inhalation two (2) times a day., Normal, Disp-1 Inhaler, R-0             Procedures:  Procedures    Please note that this dictation was completed with Dragon, computer voice recognition software. Quite often unanticipated grammatical, syntax, homophones, and other interpretive errors are inadvertently transcribed by the computer software. Please disregard these errors. Additionally, please excuse any errors that have escaped final proofreading.     Diagnosis     Clinical Impression:   1. Abrasion of left cornea, initial encounter    2. Left eye injury, initial encounter    3.  Subconjunctival hemorrhage, left

## 2022-10-15 ENCOUNTER — HOSPITAL ENCOUNTER (EMERGENCY)
Age: 62
Discharge: HOME | End: 2022-10-15
Attending: EMERGENCY MEDICINE
Payer: MEDICAID

## 2022-10-15 VITALS
HEIGHT: 65 IN | DIASTOLIC BLOOD PRESSURE: 91 MMHG | RESPIRATION RATE: 16 BRPM | TEMPERATURE: 97.7 F | WEIGHT: 116 LBS | OXYGEN SATURATION: 99 % | HEART RATE: 72 BPM | SYSTOLIC BLOOD PRESSURE: 131 MMHG | BODY MASS INDEX: 19.33 KG/M2

## 2022-10-15 DIAGNOSIS — M54.50 CHRONIC RIGHT-SIDED LOW BACK PAIN WITHOUT SCIATICA: Primary | ICD-10-CM

## 2022-10-15 DIAGNOSIS — G89.29 CHRONIC RIGHT-SIDED LOW BACK PAIN WITHOUT SCIATICA: Primary | ICD-10-CM

## 2022-10-15 PROCEDURE — 74011250636 HC RX REV CODE- 250/636: Performed by: NURSE PRACTITIONER

## 2022-10-15 PROCEDURE — 99284 EMERGENCY DEPT VISIT MOD MDM: CPT

## 2022-10-15 PROCEDURE — 96372 THER/PROPH/DIAG INJ SC/IM: CPT

## 2022-10-15 RX ORDER — KETOROLAC TROMETHAMINE 30 MG/ML
30 INJECTION, SOLUTION INTRAMUSCULAR; INTRAVENOUS
Status: COMPLETED | OUTPATIENT
Start: 2022-10-15 | End: 2022-10-15

## 2022-10-15 RX ORDER — ACETAMINOPHEN 500 MG
1000 TABLET ORAL
Qty: 20 TABLET | Refills: 0 | Status: SHIPPED | OUTPATIENT
Start: 2022-10-15

## 2022-10-15 RX ORDER — DICLOFENAC SODIUM 75 MG/1
75 TABLET, DELAYED RELEASE ORAL 2 TIMES DAILY
Qty: 20 TABLET | Refills: 0 | Status: SHIPPED | OUTPATIENT
Start: 2022-10-15

## 2022-10-15 RX ORDER — FAMOTIDINE 20 MG/1
20 TABLET, FILM COATED ORAL 2 TIMES DAILY
Qty: 20 TABLET | Refills: 0 | Status: SHIPPED | OUTPATIENT
Start: 2022-10-15 | End: 2022-10-25

## 2022-10-15 RX ADMIN — KETOROLAC TROMETHAMINE 30 MG: 30 INJECTION, SOLUTION INTRAMUSCULAR; INTRAVENOUS at 18:05

## 2022-10-15 NOTE — ED NOTES
Patient (s) given copy of dc instructions and script(s). Patient (s) verbalized understanding of instructions and script (s). Patient given a current medication reconciliation form and verbalized understanding of their medications. Patient (s) verbalized understanding of the importance of discussing medications with  his or her physician or clinic they will be following up with. Patient alert and oriented and in no acute distress. Patient discharged home ambulatory with cab.

## 2022-10-15 NOTE — ED PROVIDER NOTES
EMERGENCY DEPARTMENT HISTORY AND PHYSICAL EXAM          Date: 10/15/2022  Patient Name: Darion Grey    History of Presenting Illness     Chief Complaint   Patient presents with    Back Pain     Per pt she has chronic back pain and cannot have narcotics due to living in a recovery house         History Provided By: Patient    Chief Complaint: back pain  Duration: onset  2 weeks ago   Timing:  Gradual and Progressive  Location: left lower back  Quality: Sharp  Severity: 10 out of 10  Modifying Factors: moving worsens pain  Associated Symptoms: denies any other associated signs or symptoms      HPI: Darion Grey is a 60 y.o. female with a PMH of  COPD chronic back pain cervical cancer  who presents with right-sided low back pain acute onset 2 weeks ago. Patient states she has chronic pain and took a course of NSAIDs and muscle relaxers which relieved her pain and now pain is reoccurred. She denies any injury. She denies bowel bladder incontinence. PCP: Bina Corona MD    Current Outpatient Medications   Medication Sig Dispense Refill    diclofenac EC (VOLTAREN) 75 mg EC tablet Take 1 Tablet by mouth two (2) times a day. 20 Tablet 0    famotidine (Pepcid) 20 mg tablet Take 1 Tablet by mouth two (2) times a day for 10 days. 20 Tablet 0    acetaminophen (Tylenol Extra Strength) 500 mg tablet Take 2 Tablets by mouth every six (6) hours as needed for Pain. 20 Tablet 0    erythromycin (ILOTYCIN) ophthalmic ointment Apply 1/2 inch ribbon to left eye every 6 hours for 7 days  Indications: pink eye from bacterial infection 1 g 0    lithium carbonate 300 mg tablet Take 1 Tab by mouth three (3) times daily. (Patient not taking: Reported on 6/29/2021) 21 Tab 0    HYDROcodone-acetaminophen (NORCO) 5-325 mg per tablet Take 1 Tab by mouth every four (4) hours as needed for Pain. Max Daily Amount: 6 Tabs.  (Patient not taking: Reported on 6/29/2021) 10 Tab 0    ondansetron (ZOFRAN ODT) 8 mg disintegrating tablet Take 1 Tab by mouth every eight (8) hours as needed for Nausea. (Patient not taking: Reported on 6/29/2021) 15 Tab 0    nystatin (MYCOSTATIN) 100,000 unit/mL suspension Take 5 mL by mouth four (4) times daily. swish and spit (Patient not taking: Reported on 6/29/2021) 473 mL 0    clonazePAM (KLONOPIN) 0.5 mg tablet Take 0.5 mg by mouth nightly as needed for Other. Indications: PANIC DISORDER      clopidogrel (PLAVIX) 75 mg tablet Take 1 Tab by mouth daily for 30 days. 30 Tab 12    esomeprazole (NEXIUM) 20 mg capsule Take  by mouth daily. diazepam (VALIUM) 5 mg tablet Take 1 Tab by mouth every eight (8) hours as needed (spasm). Max Daily Amount: 15 mg. 15 Tab 0    tiotropium (SPIRIVA) 18 mcg inhalation capsule Take 1 Cap by inhalation daily. 30 Cap 0    ranitidine (ZANTAC) 150 mg tablet Take 150 mg by mouth two (2) times a day. albuterol (PROVENTIL VENTOLIN) 2.5 mg /3 mL (0.083 %) nebulizer solution 3 mL by Nebulization route every four (4) hours as needed for Wheezing or Shortness of Breath. 1 Package 2    fluticasone-salmeterol (ADVAIR) 250-50 mcg/dose diskus inhaler Take 1 Puff by inhalation two (2) times a day.  1 Inhaler 0       Past History     Past Medical History:  Past Medical History:   Diagnosis Date    ARF (acute renal failure) ([facility] [address] 7/4/2014    Arthritis     Cancer ([facility] [address]     cervical and labia cancer    Carpal tunnel syndrome     Chronic pain     COPD     DDD (degenerative disc disease)     Depression 7/4/2014    Fibromyalgia     Gastrointestinal disorder     ischemic bowel    Ill-defined condition     ischemic colitis 2014    Psychiatric disorder     anxiety       Past Surgical History:  Past Surgical History:   Procedure Laterality Date    HX CARPAL TUNNEL RELEASE      HX GI      ileostomy July 2014    HX GYN  hysterectomy age 23    HX HEENT      tmj    HX ORTHOPAEDIC      neck surgery    HX ORTHOPAEDIC      knee surgery    HX OTHER SURGICAL         Family History:  Family History   Problem Relation Age of Onset    Heart Disease Mother     Heart Disease Father     Alcohol abuse Neg Hx     Arthritis-rheumatoid Neg Hx     Asthma Neg Hx     Bleeding Prob Neg Hx     Cancer Neg Hx     Diabetes Neg Hx     Elevated Lipids Neg Hx     Headache Neg Hx     Hypertension Neg Hx     Lung Disease Neg Hx     Migraines Neg Hx     Psychiatric Disorder Neg Hx     Stroke Neg Hx     Mental Retardation Neg Hx        Social History:  Social History     Tobacco Use    Smoking status: Some Days     Packs/day: 0.25     Types: Cigarettes    Smokeless tobacco: Never    Tobacco comments:     3 a day   Substance Use Topics    Alcohol use: No    Drug use: No     Types: Marijuana       Allergies: Allergies   Allergen Reactions    Codeine Anaphylaxis and Rash    Mucinex [Guaifenesin] Anaphylaxis    Morphine Nausea and Vomiting     Patient reports this drug \"makes her heart go crazy. \"     Oxycodone Itching     Mouth, lips, skin itching. Patient can take Percocet    Pcn [Penicillins] Palpitations    Sulfa (Sulfonamide Antibiotics) Nausea Only    Tramadol Nausea and Vomiting    Aspirin Other (comments)     Pt has ulcers. PT REPORTS TAKES REGULARLY    Gabapentin Nausea and Vomiting    Trazodone Other (comments)     Restless leg syndrome    Pamelor [Nortriptyline] Other (comments)     Restless legs           Review of Systems   Review of Systems   Constitutional:  Negative for fatigue and fever. Respiratory:  Negative for shortness of breath and wheezing. Cardiovascular:  Negative for chest pain. Gastrointestinal:  Negative for abdominal pain. Musculoskeletal:  Positive for back pain. Negative for arthralgias, myalgias, neck pain and neck stiffness. Skin:  Negative for pallor and rash. Neurological:  Negative for dizziness, tremors and headaches. All other systems reviewed and are negative.     Physical Exam     Vitals:    10/15/22 1606   BP: 128/86   Pulse: (!) 118   Resp: 18   Temp: 97.7 °F (36.5 °C)   SpO2: 98%   Weight: 52.6 kg (116 lb)   Height: 5' 5\" (1.651 m)     Physical Exam  Vitals and nursing note reviewed. Constitutional:       General: She is not in acute distress. Appearance: Normal appearance. She is well-developed. HENT:      Head: Normocephalic and atraumatic. Right Ear: External ear normal.      Left Ear: External ear normal.      Nose: Nose normal.   Eyes:      Conjunctiva/sclera: Conjunctivae normal.   Cardiovascular:      Rate and Rhythm: Normal rate and regular rhythm. Heart sounds: Normal heart sounds. Pulmonary:      Effort: Pulmonary effort is normal. No respiratory distress. Breath sounds: Normal breath sounds. No wheezing. Abdominal:      General: Bowel sounds are normal.      Palpations: Abdomen is soft. Tenderness: There is no abdominal tenderness. Musculoskeletal:         General: Tenderness present. Normal range of motion. Cervical back: Normal range of motion and neck supple. Comments: Right LS spine paravertebral  Muscle tenderness . No midline tenderness DNV intact Negative SLR. Lymphadenopathy:      Cervical: No cervical adenopathy. Skin:     General: Skin is warm and dry. Findings: No rash. Neurological:      Mental Status: She is alert and oriented to person, place, and time. Cranial Nerves: No cranial nerve deficit. Coordination: Coordination normal.   Psychiatric:         Behavior: Behavior normal.         Thought Content: Thought content normal.         Judgment: Judgment normal.             Medical Decision Making   I am the first provider for this patient. I reviewed the vital signs, available nursing notes, past medical history, past surgical history, family history and social history. Vital Signs-Reviewed the patient's vital signs.     Records Reviewed: Nursing Notes    Provider Notes (Medical Decision Making):               Procedures:  Procedures    Diagnostic Study Results     Labs -   No results found for this or any previous visit (from the past 12 hour(s)). Radiologic Studies -   No orders to display     CT Results  (Last 48 hours)      None          CXR Results  (Last 48 hours)      None                Disposition:  home    DISCHARGE NOTE:     I have discussed with patient their diagnosis, treatment, and follow up plan. The patient agrees to follow up as outlined in discharge paperwork and also to return to the ED with any worsening. Letha Jorgensen NP     Follow-up Information       Follow up With Specialties Details Why Contact Info    Nichole Peterson MD Family Medicine In 1 week  [address]  [phone]              Current Discharge Medication List        START taking these medications    Details   diclofenac EC (VOLTAREN) 75 mg EC tablet Take 1 Tablet by mouth two (2) times a day. Qty: 20 Tablet, Refills: 0  Start date: 10/15/2022      famotidine (Pepcid) 20 mg tablet Take 1 Tablet by mouth two (2) times a day for 10 days. Qty: 20 Tablet, Refills: 0  Start date: 10/15/2022, End date: 10/25/2022           CONTINUE these medications which have CHANGED    Details   acetaminophen (Tylenol Extra Strength) 500 mg tablet Take 2 Tablets by mouth every six (6) hours as needed for Pain. Qty: 20 Tablet, Refills: 0  Start date: 10/15/2022               Please note that this dictation was completed with Dragon, computer voice recognition software. Quite often unanticipated grammatical, syntax, homophones, and other interpretive errors are inadvertently transcribed by the computer software. Please disregard these errors. Additionally, please excuse any errors that have escaped final proofreading. Diagnosis     Clinical Impression:   1.  Chronic right-sided low back pain without sciatica

## 2023-06-04 ENCOUNTER — HOSPITAL ENCOUNTER (EMERGENCY)
Facility: HOSPITAL | Age: 63
Discharge: HOME | End: 2023-06-04
Attending: EMERGENCY MEDICINE
Payer: COMMERCIAL

## 2023-06-04 ENCOUNTER — APPOINTMENT (OUTPATIENT)
Facility: HOSPITAL | Age: 63
End: 2023-06-04
Payer: COMMERCIAL

## 2023-06-04 VITALS
SYSTOLIC BLOOD PRESSURE: 131 MMHG | RESPIRATION RATE: 20 BRPM | HEART RATE: 75 BPM | BODY MASS INDEX: 20.57 KG/M2 | TEMPERATURE: 97.5 F | OXYGEN SATURATION: 96 % | DIASTOLIC BLOOD PRESSURE: 74 MMHG | WEIGHT: 123.46 LBS | HEIGHT: 65 IN

## 2023-06-04 DIAGNOSIS — R10.32 ABDOMINAL PAIN, LEFT LOWER QUADRANT: Primary | ICD-10-CM

## 2023-06-04 DIAGNOSIS — N23 URETERAL COLIC: ICD-10-CM

## 2023-06-04 LAB
ALBUMIN SERPL-MCNC: 3.5 G/DL (ref 3.5–5)
ALBUMIN/GLOB SERPL: 0.9 (ref 1.1–2.2)
ALP SERPL-CCNC: 83 U/L (ref 45–117)
ALT SERPL-CCNC: 28 U/L (ref 12–78)
ANION GAP SERPL CALC-SCNC: 9 MMOL/L (ref 5–15)
APPEARANCE UR: CLEAR
AST SERPL-CCNC: 16 U/L (ref 15–37)
BACTERIA URNS QL MICRO: NEGATIVE /HPF
BASOPHILS # BLD: 0 K/UL (ref 0–0.1)
BASOPHILS NFR BLD: 1 % (ref 0–1)
BILIRUB SERPL-MCNC: 0.7 MG/DL (ref 0.2–1)
BILIRUB UR QL: NEGATIVE
BUN SERPL-MCNC: 26 MG/DL (ref 6–20)
BUN/CREAT SERPL: 19 (ref 12–20)
CALCIUM SERPL-MCNC: 8.9 MG/DL (ref 8.5–10.1)
CHLORIDE SERPL-SCNC: 104 MMOL/L (ref 97–108)
CO2 SERPL-SCNC: 30 MMOL/L (ref 21–32)
COLOR UR: ABNORMAL
CREAT SERPL-MCNC: 1.39 MG/DL (ref 0.55–1.02)
DIFFERENTIAL METHOD BLD: ABNORMAL
EOSINOPHIL # BLD: 0.1 K/UL (ref 0–0.4)
EOSINOPHIL NFR BLD: 1 % (ref 0–7)
EPITH CASTS URNS QL MICRO: ABNORMAL /LPF
ERYTHROCYTE [DISTWIDTH] IN BLOOD BY AUTOMATED COUNT: 13.8 % (ref 11.5–14.5)
GLOBULIN SER CALC-MCNC: 4.1 G/DL (ref 2–4)
GLUCOSE SERPL-MCNC: 86 MG/DL (ref 65–100)
GLUCOSE UR STRIP.AUTO-MCNC: NEGATIVE MG/DL
HCT VFR BLD AUTO: 50 % (ref 35–47)
HGB BLD-MCNC: 16.7 G/DL (ref 11.5–16)
HGB UR QL STRIP: ABNORMAL
IMM GRANULOCYTES # BLD AUTO: 0 K/UL (ref 0–0.04)
IMM GRANULOCYTES NFR BLD AUTO: 0 % (ref 0–0.5)
KETONES UR QL STRIP.AUTO: NEGATIVE MG/DL
LEUKOCYTE ESTERASE UR QL STRIP.AUTO: NEGATIVE
LYMPHOCYTES # BLD: 1.5 K/UL (ref 0.8–3.5)
LYMPHOCYTES NFR BLD: 19 % (ref 12–49)
MCH RBC QN AUTO: 33 PG (ref 26–34)
MCHC RBC AUTO-ENTMCNC: 33.4 G/DL (ref 30–36.5)
MCV RBC AUTO: 98.8 FL (ref 80–99)
MONOCYTES # BLD: 0.6 K/UL (ref 0–1)
MONOCYTES NFR BLD: 7 % (ref 5–13)
NEUTS SEG # BLD: 5.7 K/UL (ref 1.8–8)
NEUTS SEG NFR BLD: 72 % (ref 32–75)
NITRITE UR QL STRIP.AUTO: NEGATIVE
NRBC # BLD: 0 K/UL (ref 0–0.01)
NRBC BLD-RTO: 0 PER 100 WBC
PH UR STRIP: 6 (ref 5–8)
PLATELET # BLD AUTO: 196 K/UL (ref 150–400)
PMV BLD AUTO: 10 FL (ref 8.9–12.9)
POTASSIUM SERPL-SCNC: 3.8 MMOL/L (ref 3.5–5.1)
PROT SERPL-MCNC: 7.6 G/DL (ref 6.4–8.2)
PROT UR STRIP-MCNC: 30 MG/DL
RBC # BLD AUTO: 5.06 M/UL (ref 3.8–5.2)
RBC #/AREA URNS HPF: ABNORMAL /HPF (ref 0–5)
SODIUM SERPL-SCNC: 143 MMOL/L (ref 136–145)
SP GR UR REFRACTOMETRY: 1.02
URINE CULTURE IF INDICATED: ABNORMAL
UROBILINOGEN UR QL STRIP.AUTO: 0.2 EU/DL (ref 0.2–1)
WBC # BLD AUTO: 7.9 K/UL (ref 3.6–11)
WBC URNS QL MICRO: ABNORMAL /HPF (ref 0–4)

## 2023-06-04 PROCEDURE — 96375 TX/PRO/DX INJ NEW DRUG ADDON: CPT

## 2023-06-04 PROCEDURE — 6360000002 HC RX W HCPCS: Performed by: EMERGENCY MEDICINE

## 2023-06-04 PROCEDURE — 81001 URINALYSIS AUTO W/SCOPE: CPT

## 2023-06-04 PROCEDURE — 96372 THER/PROPH/DIAG INJ SC/IM: CPT

## 2023-06-04 PROCEDURE — 96374 THER/PROPH/DIAG INJ IV PUSH: CPT

## 2023-06-04 PROCEDURE — 74177 CT ABD & PELVIS W/CONTRAST: CPT

## 2023-06-04 PROCEDURE — 99285 EMERGENCY DEPT VISIT HI MDM: CPT

## 2023-06-04 PROCEDURE — 6360000004 HC RX CONTRAST MEDICATION: Performed by: EMERGENCY MEDICINE

## 2023-06-04 PROCEDURE — 36415 COLL VENOUS BLD VENIPUNCTURE: CPT

## 2023-06-04 PROCEDURE — 85025 COMPLETE CBC W/AUTO DIFF WBC: CPT

## 2023-06-04 PROCEDURE — 80053 COMPREHEN METABOLIC PANEL: CPT

## 2023-06-04 RX ORDER — KETOROLAC TROMETHAMINE 10 MG/1
10 TABLET, FILM COATED ORAL EVERY 6 HOURS PRN
Qty: 15 TABLET | Refills: 0 | Status: SHIPPED | OUTPATIENT
Start: 2023-06-04

## 2023-06-04 RX ORDER — DICYCLOMINE HYDROCHLORIDE 10 MG/ML
20 INJECTION INTRAMUSCULAR ONCE
Status: COMPLETED | OUTPATIENT
Start: 2023-06-04 | End: 2023-06-04

## 2023-06-04 RX ORDER — KETOROLAC TROMETHAMINE 30 MG/ML
30 INJECTION, SOLUTION INTRAMUSCULAR; INTRAVENOUS
Status: COMPLETED | OUTPATIENT
Start: 2023-06-04 | End: 2023-06-04

## 2023-06-04 RX ORDER — DICYCLOMINE HYDROCHLORIDE 10 MG/ML
20 INJECTION INTRAMUSCULAR 4 TIMES DAILY
Status: DISCONTINUED | OUTPATIENT
Start: 2023-06-04 | End: 2023-06-04

## 2023-06-04 RX ORDER — ONDANSETRON 2 MG/ML
4 INJECTION INTRAMUSCULAR; INTRAVENOUS
Status: COMPLETED | OUTPATIENT
Start: 2023-06-04 | End: 2023-06-04

## 2023-06-04 RX ORDER — DICYCLOMINE HCL 20 MG
20 TABLET ORAL EVERY 6 HOURS
Qty: 24 TABLET | Refills: 0 | Status: SHIPPED | OUTPATIENT
Start: 2023-06-04

## 2023-06-04 RX ADMIN — IOPAMIDOL 100 ML: 755 INJECTION, SOLUTION INTRAVENOUS at 19:06

## 2023-06-04 RX ADMIN — DICYCLOMINE HYDROCHLORIDE 20 MG: 10 INJECTION, SOLUTION INTRAMUSCULAR at 20:57

## 2023-06-04 RX ADMIN — KETOROLAC TROMETHAMINE 30 MG: 30 INJECTION, SOLUTION INTRAMUSCULAR at 17:43

## 2023-06-04 RX ADMIN — ONDANSETRON 4 MG: 2 INJECTION INTRAMUSCULAR; INTRAVENOUS at 17:43

## 2023-06-04 ASSESSMENT — PAIN SCALES - GENERAL
PAINLEVEL_OUTOF10: 6
PAINLEVEL_OUTOF10: 10

## 2023-06-04 ASSESSMENT — PAIN DESCRIPTION - DESCRIPTORS: DESCRIPTORS: SHARP;STABBING

## 2023-06-04 ASSESSMENT — PAIN DESCRIPTION - PAIN TYPE: TYPE: ACUTE PAIN

## 2023-06-04 ASSESSMENT — PAIN DESCRIPTION - ORIENTATION: ORIENTATION: LOWER;MID

## 2023-06-04 ASSESSMENT — PAIN DESCRIPTION - FREQUENCY: FREQUENCY: CONTINUOUS

## 2023-06-04 ASSESSMENT — PAIN - FUNCTIONAL ASSESSMENT: PAIN_FUNCTIONAL_ASSESSMENT: 0-10

## 2023-06-04 ASSESSMENT — PAIN DESCRIPTION - LOCATION: LOCATION: ABDOMEN

## 2023-06-12 ENCOUNTER — HOSPITAL ENCOUNTER (EMERGENCY)
Facility: HOSPITAL | Age: 63
Discharge: HOME | End: 2023-06-12
Attending: EMERGENCY MEDICINE
Payer: COMMERCIAL

## 2023-06-12 VITALS
BODY MASS INDEX: 21.66 KG/M2 | OXYGEN SATURATION: 96 % | RESPIRATION RATE: 18 BRPM | HEART RATE: 90 BPM | SYSTOLIC BLOOD PRESSURE: 128 MMHG | DIASTOLIC BLOOD PRESSURE: 87 MMHG | WEIGHT: 130 LBS | HEIGHT: 65 IN | TEMPERATURE: 98.1 F

## 2023-06-12 DIAGNOSIS — Z90.49 STATUS POST COLECTOMY: ICD-10-CM

## 2023-06-12 DIAGNOSIS — R10.32 LEFT LOWER QUADRANT ABDOMINAL PAIN: Primary | ICD-10-CM

## 2023-06-12 LAB
ALBUMIN SERPL-MCNC: 3.7 G/DL (ref 3.5–5)
ALBUMIN/GLOB SERPL: 1 (ref 1.1–2.2)
ALP SERPL-CCNC: 82 U/L (ref 45–117)
ALT SERPL-CCNC: 37 U/L (ref 12–78)
ANION GAP BLD CALC-SCNC: 6 (ref 10–20)
ANION GAP SERPL CALC-SCNC: 10 MMOL/L (ref 5–15)
APPEARANCE UR: CLEAR
AST SERPL-CCNC: 22 U/L (ref 15–37)
BACTERIA URNS QL MICRO: NEGATIVE /HPF
BASOPHILS # BLD: 0 K/UL (ref 0–0.1)
BASOPHILS NFR BLD: 0 % (ref 0–1)
BILIRUB SERPL-MCNC: 1 MG/DL (ref 0.2–1)
BILIRUB UR QL: NEGATIVE
BUN SERPL-MCNC: 13 MG/DL (ref 6–20)
BUN/CREAT SERPL: 8 (ref 12–20)
CA-I BLD-MCNC: 1.15 MMOL/L (ref 1.12–1.32)
CALCIUM SERPL-MCNC: 9.4 MG/DL (ref 8.5–10.1)
CHLORIDE BLD-SCNC: 109 MMOL/L (ref 100–108)
CHLORIDE SERPL-SCNC: 102 MMOL/L (ref 97–108)
CO2 BLD-SCNC: 26 MMOL/L (ref 19–24)
CO2 SERPL-SCNC: 27 MMOL/L (ref 21–32)
COLOR UR: ABNORMAL
CREAT SERPL-MCNC: 1.71 MG/DL (ref 0.55–1.02)
CREAT UR-MCNC: 1.2 MG/DL (ref 0.6–1.3)
DIFFERENTIAL METHOD BLD: NORMAL
EOSINOPHIL # BLD: 0.1 K/UL (ref 0–0.4)
EOSINOPHIL NFR BLD: 1 % (ref 0–7)
EPITH CASTS URNS QL MICRO: ABNORMAL /LPF
ERYTHROCYTE [DISTWIDTH] IN BLOOD BY AUTOMATED COUNT: 13.9 % (ref 11.5–14.5)
GLOBULIN SER CALC-MCNC: 3.7 G/DL (ref 2–4)
GLUCOSE BLD STRIP.AUTO-MCNC: 125 MG/DL (ref 65–117)
GLUCOSE BLD STRIP.AUTO-MCNC: 57 MG/DL (ref 74–106)
GLUCOSE BLD STRIP.AUTO-MCNC: 96 MG/DL (ref 65–117)
GLUCOSE SERPL-MCNC: 95 MG/DL (ref 65–100)
GLUCOSE UR STRIP.AUTO-MCNC: NEGATIVE MG/DL
HCT VFR BLD AUTO: 46.1 % (ref 35–47)
HGB BLD-MCNC: 15.6 G/DL (ref 11.5–16)
HGB UR QL STRIP: NEGATIVE
IMM GRANULOCYTES # BLD AUTO: 0 K/UL (ref 0–0.04)
IMM GRANULOCYTES NFR BLD AUTO: 0 % (ref 0–0.5)
KETONES UR QL STRIP.AUTO: NEGATIVE MG/DL
LACTATE BLD-SCNC: 0.82 MMOL/L (ref 0.4–2)
LEUKOCYTE ESTERASE UR QL STRIP.AUTO: ABNORMAL
LIPASE SERPL-CCNC: 48 U/L (ref 73–393)
LYMPHOCYTES # BLD: 1.6 K/UL (ref 0.8–3.5)
LYMPHOCYTES NFR BLD: 24 % (ref 12–49)
MCH RBC QN AUTO: 33.1 PG (ref 26–34)
MCHC RBC AUTO-ENTMCNC: 33.8 G/DL (ref 30–36.5)
MCV RBC AUTO: 97.9 FL (ref 80–99)
MONOCYTES # BLD: 0.5 K/UL (ref 0–1)
MONOCYTES NFR BLD: 7 % (ref 5–13)
NEUTS SEG # BLD: 4.5 K/UL (ref 1.8–8)
NEUTS SEG NFR BLD: 68 % (ref 32–75)
NITRITE UR QL STRIP.AUTO: NEGATIVE
NRBC # BLD: 0 K/UL (ref 0–0.01)
NRBC BLD-RTO: 0 PER 100 WBC
PH UR STRIP: 7.5 (ref 5–8)
PLATELET # BLD AUTO: 252 K/UL (ref 150–400)
PMV BLD AUTO: 9.7 FL (ref 8.9–12.9)
POTASSIUM BLD-SCNC: 4 MMOL/L (ref 3.5–5.5)
POTASSIUM SERPL-SCNC: 3.9 MMOL/L (ref 3.5–5.1)
PROT SERPL-MCNC: 7.4 G/DL (ref 6.4–8.2)
PROT UR STRIP-MCNC: NEGATIVE MG/DL
RBC # BLD AUTO: 4.71 M/UL (ref 3.8–5.2)
RBC #/AREA URNS HPF: ABNORMAL /HPF (ref 0–5)
SERVICE CMNT-IMP: ABNORMAL
SERVICE CMNT-IMP: ABNORMAL
SERVICE CMNT-IMP: NORMAL
SODIUM BLD-SCNC: 141 MMOL/L (ref 136–145)
SODIUM SERPL-SCNC: 139 MMOL/L (ref 136–145)
SP GR UR REFRACTOMETRY: 1.01
SPECIMEN SITE: ABNORMAL
URINE CULTURE IF INDICATED: ABNORMAL
UROBILINOGEN UR QL STRIP.AUTO: 0.2 EU/DL (ref 0.2–1)
WBC # BLD AUTO: 6.7 K/UL (ref 3.6–11)
WBC URNS QL MICRO: ABNORMAL /HPF (ref 0–4)

## 2023-06-12 PROCEDURE — 96375 TX/PRO/DX INJ NEW DRUG ADDON: CPT

## 2023-06-12 PROCEDURE — 83605 ASSAY OF LACTIC ACID: CPT

## 2023-06-12 PROCEDURE — 83690 ASSAY OF LIPASE: CPT

## 2023-06-12 PROCEDURE — 99284 EMERGENCY DEPT VISIT MOD MDM: CPT

## 2023-06-12 PROCEDURE — 6360000002 HC RX W HCPCS

## 2023-06-12 PROCEDURE — 80047 BASIC METABLC PNL IONIZED CA: CPT

## 2023-06-12 PROCEDURE — 85025 COMPLETE CBC W/AUTO DIFF WBC: CPT

## 2023-06-12 PROCEDURE — 82962 GLUCOSE BLOOD TEST: CPT

## 2023-06-12 PROCEDURE — 81001 URINALYSIS AUTO W/SCOPE: CPT

## 2023-06-12 PROCEDURE — 36415 COLL VENOUS BLD VENIPUNCTURE: CPT

## 2023-06-12 PROCEDURE — 96374 THER/PROPH/DIAG INJ IV PUSH: CPT

## 2023-06-12 PROCEDURE — 80053 COMPREHEN METABOLIC PANEL: CPT

## 2023-06-12 RX ORDER — ONDANSETRON 2 MG/ML
4 INJECTION INTRAMUSCULAR; INTRAVENOUS ONCE
Status: COMPLETED | OUTPATIENT
Start: 2023-06-12 | End: 2023-06-12

## 2023-06-12 RX ORDER — CIPROFLOXACIN 500 MG/1
500 TABLET, FILM COATED ORAL 2 TIMES DAILY
Qty: 20 TABLET | Refills: 0 | Status: SHIPPED | OUTPATIENT
Start: 2023-06-12 | End: 2023-06-22

## 2023-06-12 RX ORDER — KETOROLAC TROMETHAMINE 30 MG/ML
15 INJECTION, SOLUTION INTRAMUSCULAR; INTRAVENOUS
Status: COMPLETED | OUTPATIENT
Start: 2023-06-12 | End: 2023-06-12

## 2023-06-12 RX ORDER — METRONIDAZOLE 500 MG/1
500 TABLET ORAL 3 TIMES DAILY
Qty: 30 TABLET | Refills: 0 | Status: SHIPPED | OUTPATIENT
Start: 2023-06-12 | End: 2023-06-22

## 2023-06-12 RX ADMIN — KETOROLAC TROMETHAMINE 15 MG: 30 INJECTION, SOLUTION INTRAMUSCULAR; INTRAVENOUS at 11:34

## 2023-06-12 RX ADMIN — ONDANSETRON 4 MG: 2 INJECTION INTRAMUSCULAR; INTRAVENOUS at 11:34

## 2023-06-12 ASSESSMENT — PAIN DESCRIPTION - LOCATION: LOCATION: ABDOMEN

## 2023-06-12 ASSESSMENT — PAIN SCALES - GENERAL: PAINLEVEL_OUTOF10: 10

## 2023-06-12 ASSESSMENT — PAIN DESCRIPTION - ORIENTATION: ORIENTATION: LEFT

## 2023-06-12 ASSESSMENT — PAIN - FUNCTIONAL ASSESSMENT: PAIN_FUNCTIONAL_ASSESSMENT: 0-10

## 2023-06-12 NOTE — ED TRIAGE NOTES
Presents via EMS with complaint of left sided abd pain. Was diagnosed with kidney stone a week ago. States pain is 10/10 and has not improved. Finished prescription yesterday.      On 2L NC chronically- hx of COPD/Emphysema

## 2023-06-12 NOTE — ED PROVIDER NOTES
[facility] EMERGENCY DEPT  EMERGENCY DEPARTMENT ENCOUNTER       Pt Name: Sindy Mcfarland  MRN: [id number]  [place] 1960  Date of evaluation: 6/12/2023  Provider: Sherman Tucker PA-C   PCP: Michael Knox MD  Note Started: 11:33 AM EDT 6/12/23     CHIEF COMPLAINT       Chief Complaint   Patient presents with    Abdominal Pain        HISTORY OF PRESENT ILLNESS: 1 or more elements      History From: Patient  HPI Limitations: None     Sindy Mcfarland is a 58 y.o. female with past medical history cervical cancer, depression, fibromyalgia, ischemic bowel status post colectomy with ileostomy bag, and anxiety ileostomy bag who presents complaining of left-sided abdominal pain x1 week. She reports she was seen on 6/4/23, was prescribed Toradol tablets and reports improvement in pain with Toradol. She reports she finished the prescription yesterday, began having pain again. She states she has not been eating much due to nausea. She denies fever, chills, vomiting, dysuria, hematuria, ileostomy site pain, surrounding erythema, changes in ileostomy output, chest pain, shortness of breath. Nursing Notes were all reviewed and agreed with or any disagreements were addressed in the HPI. REVIEW OF SYSTEMS      Review of Systems     Positives and Pertinent negatives as per HPI.     PAST HISTORY     Past Medical History:  Past Medical History:   Diagnosis Date    ARF (acute renal failure) ([facility] [address] 7/4/2014    Arthritis     Cancer (HCC)     cervical and labia cancer    Carpal tunnel syndrome     Chronic pain     DDD (degenerative disc disease)     Depression 7/4/2014    Fibromyalgia     Gastrointestinal disorder     ischemic bowel    Ill-defined condition     ischemic colitis 2014    Psychiatric disorder     anxiety       Past Surgical History:  Past Surgical History:   Procedure Laterality Date    CARPAL TUNNEL RELEASE      GI      ileostomy July 2014    GYN  hysterectomy age 23    HEENT      tmj    ORTHOPEDIC SURGERY      neck

## 2023-06-12 NOTE — ED NOTES
Pt presents to ED via EMS complaining of LLQ abdominal pain x 1 week. Pt states she was diagnosed with kidney stones and has run out of her medication. Pt states that her pain is localized to her left kidney and pt endorses nausea with no V/D. Pt has hx of COPD, emphysema, and wears 2 L O2 via nasal cannula at home. Pt is alert and oriented x 4, RR even and unlabored, skin is warm and dry. Pt appears in NAD at this time. Assessment completed and pt updated on plan of care. Call bell in reach. Emergency Department Nursing Plan of Care  The Nursing Plan of Care is developed from the Nursing assessment and Emergency Department Attending provider initial evaluation. The plan of care may be reviewed in the ED Provider note.   The Plan of Care was developed with the following considerations:  Patient / Family readiness to learn indicated by:Refer to Medical chart in [facility]  Persons(s) to be included in education: Refer to Medical chart in [facility]  Barriers to Learning/Limitations:Normal       Estelle Castellanos RN  06/12/23 [id number]

## 2023-06-12 NOTE — ED NOTES
Discharge instructions were given to the patient by Garcia Brito RN  . The patient left the Emergency Department alert and oriented and in no acute distress with 2 prescription(s). The patient was encouraged to call or return to the ED for worsening issues or problems and was encouraged to schedule a follow up appointment for continuing care. The patient verbalized understanding of discharge instructions and prescriptions; all questions were answered. The patient has no further concerns at this time.          Garcia Brito RN  06/12/23 [id number]